# Patient Record
Sex: FEMALE | Race: WHITE | Employment: OTHER | URBAN - METROPOLITAN AREA
[De-identification: names, ages, dates, MRNs, and addresses within clinical notes are randomized per-mention and may not be internally consistent; named-entity substitution may affect disease eponyms.]

---

## 2017-02-20 ENCOUNTER — HOSPITAL ENCOUNTER (OUTPATIENT)
Dept: GENERAL RADIOLOGY | Age: 67
Discharge: HOME | End: 2017-02-20

## 2017-02-20 DIAGNOSIS — M25.551 HIP PAIN, RIGHT: ICD-10-CM

## 2017-08-31 ENCOUNTER — HOSPITAL ENCOUNTER (OUTPATIENT)
Dept: GENERAL RADIOLOGY | Age: 67
Discharge: HOME | End: 2017-08-31

## 2017-08-31 DIAGNOSIS — R51.9 FACIAL PAIN: ICD-10-CM

## 2017-12-20 PROBLEM — F33.2 MDD (MAJOR DEPRESSIVE DISORDER), RECURRENT SEVERE, WITHOUT PSYCHOSIS (HCC): Status: ACTIVE | Noted: 2017-12-20

## 2018-01-04 ENCOUNTER — HOSPITAL ENCOUNTER (OUTPATIENT)
Dept: GENERAL RADIOLOGY | Age: 68
Discharge: HOME | End: 2018-01-04

## 2018-01-04 DIAGNOSIS — M79.645 PAIN IN LEFT FINGER(S): ICD-10-CM

## 2018-01-20 PROBLEM — N18.30 CKD (CHRONIC KIDNEY DISEASE), STAGE III (HCC): Status: ACTIVE | Noted: 2018-01-20

## 2018-01-20 PROBLEM — R07.9 CHEST PAIN: Status: ACTIVE | Noted: 2018-01-20

## 2018-01-20 PROBLEM — Q60.0 CONGENITAL ABSENCE OF ONE KIDNEY: Status: ACTIVE | Noted: 2018-01-20

## 2018-04-20 PROBLEM — J40 BRONCHITIS: Status: ACTIVE | Noted: 2018-04-20

## 2018-04-20 PROBLEM — E66.9 OBESITY: Status: ACTIVE | Noted: 2018-04-20

## 2018-04-27 PROBLEM — F43.0: Status: ACTIVE | Noted: 2018-04-27

## 2018-04-27 PROBLEM — R41.2 AMNESIA (RETROGRADE): Status: ACTIVE | Noted: 2018-04-27

## 2018-06-01 ENCOUNTER — HOSPITAL ENCOUNTER (OUTPATIENT)
Dept: GENERAL RADIOLOGY | Age: 68
Discharge: HOME | End: 2018-06-01

## 2018-06-01 DIAGNOSIS — M79.602 ARM PAIN, LEFT: ICD-10-CM

## 2018-06-01 DIAGNOSIS — M79.601 ARM PAIN, RIGHT: ICD-10-CM

## 2018-12-07 ENCOUNTER — HOSPITAL ENCOUNTER (INPATIENT)
Age: 68
LOS: 9 days | Discharge: HOME | DRG: 885 | End: 2018-12-17
Attending: EMERGENCY MEDICINE | Admitting: PSYCHIATRY & NEUROLOGY
Payer: MEDICARE

## 2018-12-07 DIAGNOSIS — R45.851 DEPRESSION WITH SUICIDAL IDEATION: Primary | ICD-10-CM

## 2018-12-07 DIAGNOSIS — F32.A DEPRESSION WITH SUICIDAL IDEATION: Primary | ICD-10-CM

## 2018-12-07 LAB
A/G RATIO: 1.3 (ref 1.1–2.2)
ACETAMINOPHEN LEVEL: <5 UG/ML (ref 10–30)
ALBUMIN SERPL-MCNC: 3.7 G/DL (ref 3.4–5)
ALP BLD-CCNC: 178 U/L (ref 40–129)
ALT SERPL-CCNC: 8 U/L (ref 10–40)
AMPHETAMINE SCREEN, URINE: ABNORMAL
ANION GAP SERPL CALCULATED.3IONS-SCNC: 11 MMOL/L (ref 3–16)
AST SERPL-CCNC: 12 U/L (ref 15–37)
BARBITURATE SCREEN URINE: ABNORMAL
BASOPHILS ABSOLUTE: 0 K/UL (ref 0–0.2)
BASOPHILS RELATIVE PERCENT: 0.2 %
BENZODIAZEPINE SCREEN, URINE: ABNORMAL
BILIRUB SERPL-MCNC: 0.3 MG/DL (ref 0–1)
BILIRUBIN URINE: NEGATIVE
BLOOD, URINE: NEGATIVE
BUN BLDV-MCNC: 18 MG/DL (ref 7–20)
CALCIUM SERPL-MCNC: 9.5 MG/DL (ref 8.3–10.6)
CANNABINOID SCREEN URINE: ABNORMAL
CHLORIDE BLD-SCNC: 107 MMOL/L (ref 99–110)
CLARITY: CLEAR
CO2: 23 MMOL/L (ref 21–32)
COCAINE METABOLITE SCREEN URINE: ABNORMAL
COLOR: YELLOW
CREAT SERPL-MCNC: 1.1 MG/DL (ref 0.6–1.2)
EOSINOPHILS ABSOLUTE: 0.2 K/UL (ref 0–0.6)
EOSINOPHILS RELATIVE PERCENT: 3.2 %
ETHANOL: NORMAL MG/DL (ref 0–0.08)
GFR AFRICAN AMERICAN: 59
GFR NON-AFRICAN AMERICAN: 49
GLOBULIN: 2.9 G/DL
GLUCOSE BLD-MCNC: 120 MG/DL (ref 70–99)
GLUCOSE URINE: NEGATIVE MG/DL
HCT VFR BLD CALC: 35.6 % (ref 36–48)
HEMOGLOBIN: 11.9 G/DL (ref 12–16)
KETONES, URINE: NEGATIVE MG/DL
LEUKOCYTE ESTERASE, URINE: NEGATIVE
LYMPHOCYTES ABSOLUTE: 1.6 K/UL (ref 1–5.1)
LYMPHOCYTES RELATIVE PERCENT: 31.7 %
Lab: ABNORMAL
MCH RBC QN AUTO: 31 PG (ref 26–34)
MCHC RBC AUTO-ENTMCNC: 33.4 G/DL (ref 31–36)
MCV RBC AUTO: 93 FL (ref 80–100)
METHADONE SCREEN, URINE: ABNORMAL
MICROSCOPIC EXAMINATION: NORMAL
MONOCYTES ABSOLUTE: 0.6 K/UL (ref 0–1.3)
MONOCYTES RELATIVE PERCENT: 10.9 %
NEUTROPHILS ABSOLUTE: 2.7 K/UL (ref 1.7–7.7)
NEUTROPHILS RELATIVE PERCENT: 54 %
NITRITE, URINE: NEGATIVE
OPIATE SCREEN URINE: POSITIVE
OXYCODONE URINE: ABNORMAL
PDW BLD-RTO: 14.6 % (ref 12.4–15.4)
PH UA: 7.5
PH UA: 7.5
PHENCYCLIDINE SCREEN URINE: ABNORMAL
PLATELET # BLD: 183 K/UL (ref 135–450)
PMV BLD AUTO: 8.2 FL (ref 5–10.5)
POTASSIUM REFLEX MAGNESIUM: 3.7 MMOL/L (ref 3.5–5.1)
PROPOXYPHENE SCREEN: ABNORMAL
PROTEIN UA: NEGATIVE MG/DL
RBC # BLD: 3.83 M/UL (ref 4–5.2)
SALICYLATE, SERUM: <0.3 MG/DL (ref 15–30)
SODIUM BLD-SCNC: 141 MMOL/L (ref 136–145)
SPECIFIC GRAVITY UA: 1.01
TOTAL PROTEIN: 6.6 G/DL (ref 6.4–8.2)
URINE TYPE: NORMAL
UROBILINOGEN, URINE: 0.2 E.U./DL
WBC # BLD: 5 K/UL (ref 4–11)

## 2018-12-07 PROCEDURE — 81003 URINALYSIS AUTO W/O SCOPE: CPT

## 2018-12-07 PROCEDURE — G0480 DRUG TEST DEF 1-7 CLASSES: HCPCS

## 2018-12-07 PROCEDURE — 85025 COMPLETE CBC W/AUTO DIFF WBC: CPT

## 2018-12-07 PROCEDURE — 80053 COMPREHEN METABOLIC PANEL: CPT

## 2018-12-07 PROCEDURE — 93005 ELECTROCARDIOGRAM TRACING: CPT | Performed by: EMERGENCY MEDICINE

## 2018-12-07 PROCEDURE — 99285 EMERGENCY DEPT VISIT HI MDM: CPT

## 2018-12-07 PROCEDURE — 80307 DRUG TEST PRSMV CHEM ANLYZR: CPT

## 2018-12-07 ASSESSMENT — PAIN SCALES - GENERAL: PAINLEVEL_OUTOF10: 10

## 2018-12-08 PROBLEM — F33.9 MDD (MAJOR DEPRESSIVE DISORDER), RECURRENT EPISODE (HCC): Status: ACTIVE | Noted: 2018-12-08

## 2018-12-08 LAB
EKG ATRIAL RATE: 87 BPM
EKG DIAGNOSIS: NORMAL
EKG P AXIS: 43 DEGREES
EKG P-R INTERVAL: 190 MS
EKG Q-T INTERVAL: 364 MS
EKG QRS DURATION: 84 MS
EKG QTC CALCULATION (BAZETT): 438 MS
EKG R AXIS: 15 DEGREES
EKG T AXIS: 72 DEGREES
EKG VENTRICULAR RATE: 87 BPM

## 2018-12-08 PROCEDURE — 99222 1ST HOSP IP/OBS MODERATE 55: CPT | Performed by: PHYSICIAN ASSISTANT

## 2018-12-08 PROCEDURE — 93010 ELECTROCARDIOGRAM REPORT: CPT | Performed by: INTERNAL MEDICINE

## 2018-12-08 PROCEDURE — 1240000000 HC EMOTIONAL WELLNESS R&B

## 2018-12-08 PROCEDURE — 90792 PSYCH DIAG EVAL W/MED SRVCS: CPT | Performed by: NURSE PRACTITIONER

## 2018-12-08 RX ORDER — LORAZEPAM 0.5 MG/1
0.5 TABLET ORAL EVERY 12 HOURS PRN
Status: ON HOLD | COMMUNITY
End: 2018-12-08 | Stop reason: ALTCHOICE

## 2018-12-08 RX ORDER — ATORVASTATIN CALCIUM 80 MG/1
80 TABLET, FILM COATED ORAL NIGHTLY
COMMUNITY
Start: 2018-10-01

## 2018-12-08 RX ORDER — ASPIRIN 81 MG/1
81 TABLET, CHEWABLE ORAL DAILY
COMMUNITY

## 2018-12-08 RX ORDER — FAMOTIDINE 20 MG/1
20 TABLET, FILM COATED ORAL 2 TIMES DAILY
Status: DISCONTINUED | OUTPATIENT
Start: 2018-12-08 | End: 2018-12-08

## 2018-12-08 RX ORDER — PANTOPRAZOLE SODIUM 40 MG/1
40 TABLET, DELAYED RELEASE ORAL
Status: DISCONTINUED | OUTPATIENT
Start: 2018-12-08 | End: 2018-12-08

## 2018-12-08 RX ORDER — METHOCARBAMOL 500 MG/1
500 TABLET, FILM COATED ORAL 4 TIMES DAILY
Status: ON HOLD | COMMUNITY
End: 2018-12-17 | Stop reason: HOSPADM

## 2018-12-08 RX ORDER — ALBUTEROL SULFATE 90 UG/1
2 AEROSOL, METERED RESPIRATORY (INHALATION) EVERY 6 HOURS PRN
Status: DISCONTINUED | OUTPATIENT
Start: 2018-12-08 | End: 2018-12-08

## 2018-12-08 RX ORDER — TOPIRAMATE 100 MG/1
100 TABLET, FILM COATED ORAL 2 TIMES DAILY
Status: DISCONTINUED | OUTPATIENT
Start: 2018-12-08 | End: 2018-12-17 | Stop reason: HOSPADM

## 2018-12-08 RX ORDER — GABAPENTIN 600 MG/1
2 TABLET ORAL 2 TIMES DAILY
Status: ON HOLD | COMMUNITY
End: 2018-12-17 | Stop reason: HOSPADM

## 2018-12-08 RX ORDER — HYDROCODONE BITARTRATE AND ACETAMINOPHEN 5; 325 MG/1; MG/1
1 TABLET ORAL DAILY PRN
Status: ON HOLD | COMMUNITY
End: 2018-12-08 | Stop reason: ALTCHOICE

## 2018-12-08 RX ORDER — ATORVASTATIN CALCIUM 80 MG/1
80 TABLET, FILM COATED ORAL NIGHTLY
Status: ON HOLD | COMMUNITY
End: 2018-12-08 | Stop reason: SDUPTHER

## 2018-12-08 RX ORDER — RANOLAZINE 500 MG/1
500 TABLET, EXTENDED RELEASE ORAL 2 TIMES DAILY
Status: ON HOLD | COMMUNITY
End: 2018-12-08 | Stop reason: SDUPTHER

## 2018-12-08 RX ORDER — BUPROPION HYDROCHLORIDE 75 MG/1
75 TABLET ORAL DAILY
Status: ON HOLD | COMMUNITY
End: 2018-12-08 | Stop reason: SDUPTHER

## 2018-12-08 RX ORDER — CETIRIZINE HYDROCHLORIDE 10 MG/1
5 TABLET ORAL DAILY
Status: DISCONTINUED | OUTPATIENT
Start: 2018-12-08 | End: 2018-12-16

## 2018-12-08 RX ORDER — NITROGLYCERIN 0.4 MG/1
0.4 TABLET SUBLINGUAL EVERY 5 MIN PRN
Status: ON HOLD | COMMUNITY
End: 2018-12-08 | Stop reason: ALTCHOICE

## 2018-12-08 RX ORDER — DONEPEZIL HYDROCHLORIDE 5 MG/1
10 TABLET, FILM COATED ORAL NIGHTLY
Status: DISCONTINUED | OUTPATIENT
Start: 2018-12-08 | End: 2018-12-17 | Stop reason: HOSPADM

## 2018-12-08 RX ORDER — RANITIDINE 150 MG/1
150 TABLET ORAL 2 TIMES DAILY
Status: ON HOLD | COMMUNITY
End: 2018-12-17 | Stop reason: HOSPADM

## 2018-12-08 RX ORDER — TOPIRAMATE 100 MG/1
100 TABLET, FILM COATED ORAL 2 TIMES DAILY
Status: ON HOLD | COMMUNITY
End: 2019-04-01 | Stop reason: HOSPADM

## 2018-12-08 RX ORDER — FAMOTIDINE 20 MG/1
20 TABLET, FILM COATED ORAL 2 TIMES DAILY
Status: DISCONTINUED | OUTPATIENT
Start: 2018-12-08 | End: 2018-12-17 | Stop reason: HOSPADM

## 2018-12-08 RX ORDER — MONTELUKAST SODIUM 10 MG/1
10 TABLET ORAL NIGHTLY
Status: DISCONTINUED | OUTPATIENT
Start: 2018-12-08 | End: 2018-12-17 | Stop reason: HOSPADM

## 2018-12-08 RX ORDER — RANOLAZINE 500 MG/1
500 TABLET, EXTENDED RELEASE ORAL 2 TIMES DAILY
Status: DISCONTINUED | OUTPATIENT
Start: 2018-12-08 | End: 2018-12-08

## 2018-12-08 RX ORDER — ATORVASTATIN CALCIUM 40 MG/1
80 TABLET, FILM COATED ORAL NIGHTLY
Status: DISCONTINUED | OUTPATIENT
Start: 2018-12-08 | End: 2018-12-17 | Stop reason: HOSPADM

## 2018-12-08 RX ORDER — CLOPIDOGREL BISULFATE 75 MG/1
75 TABLET ORAL DAILY
COMMUNITY
Start: 2018-07-30

## 2018-12-08 RX ORDER — LISINOPRIL 20 MG/1
40 TABLET ORAL DAILY
Status: DISCONTINUED | OUTPATIENT
Start: 2018-12-08 | End: 2018-12-17 | Stop reason: HOSPADM

## 2018-12-08 RX ORDER — GABAPENTIN 600 MG/1
1200 TABLET ORAL 2 TIMES DAILY
Status: ON HOLD | COMMUNITY
End: 2018-12-08 | Stop reason: SDUPTHER

## 2018-12-08 RX ORDER — LISINOPRIL 40 MG/1
40 TABLET ORAL DAILY
Status: ON HOLD | COMMUNITY
End: 2019-04-01 | Stop reason: HOSPADM

## 2018-12-08 RX ORDER — LEVOCETIRIZINE DIHYDROCHLORIDE 5 MG/1
5 TABLET, FILM COATED ORAL NIGHTLY
Status: ON HOLD | COMMUNITY
End: 2018-12-17 | Stop reason: HOSPADM

## 2018-12-08 RX ORDER — RANITIDINE 150 MG/1
150 TABLET ORAL 2 TIMES DAILY
Status: ON HOLD | COMMUNITY
End: 2018-12-08 | Stop reason: SDUPTHER

## 2018-12-08 RX ORDER — RANOLAZINE 500 MG/1
500 TABLET, EXTENDED RELEASE ORAL 2 TIMES DAILY
Status: DISCONTINUED | OUTPATIENT
Start: 2018-12-08 | End: 2018-12-17 | Stop reason: HOSPADM

## 2018-12-08 RX ORDER — LEVOTHYROXINE SODIUM 0.1 MG/1
100 TABLET ORAL DAILY
Status: DISCONTINUED | OUTPATIENT
Start: 2018-12-08 | End: 2018-12-08

## 2018-12-08 RX ORDER — CHOLECALCIFEROL (VITAMIN D3) 125 MCG
1000 CAPSULE ORAL DAILY
Status: DISCONTINUED | OUTPATIENT
Start: 2018-12-08 | End: 2018-12-08

## 2018-12-08 RX ORDER — CYANOCOBALAMIN (VITAMIN B-12) 1000 MCG
1000 TABLET ORAL DAILY
COMMUNITY
Start: 2016-06-01

## 2018-12-08 RX ORDER — PANTOPRAZOLE SODIUM 40 MG/1
40 TABLET, DELAYED RELEASE ORAL 2 TIMES DAILY
Status: DISCONTINUED | OUTPATIENT
Start: 2018-12-08 | End: 2018-12-17 | Stop reason: HOSPADM

## 2018-12-08 RX ORDER — TRAZODONE HYDROCHLORIDE 50 MG/1
100 TABLET ORAL NIGHTLY
Status: ON HOLD | COMMUNITY
Start: 2018-03-06 | End: 2018-12-17 | Stop reason: HOSPADM

## 2018-12-08 RX ORDER — CHOLECALCIFEROL (VITAMIN D3) 125 MCG
1000 CAPSULE ORAL DAILY
Status: DISCONTINUED | OUTPATIENT
Start: 2018-12-08 | End: 2018-12-17 | Stop reason: HOSPADM

## 2018-12-08 RX ORDER — ATORVASTATIN CALCIUM 40 MG/1
80 TABLET, FILM COATED ORAL NIGHTLY
Status: DISCONTINUED | OUTPATIENT
Start: 2018-12-08 | End: 2018-12-08

## 2018-12-08 RX ORDER — MONTELUKAST SODIUM 10 MG/1
10 TABLET ORAL NIGHTLY
COMMUNITY
End: 2020-04-16

## 2018-12-08 RX ORDER — DONEPEZIL HYDROCHLORIDE 10 MG/1
10 TABLET, FILM COATED ORAL NIGHTLY
COMMUNITY

## 2018-12-08 RX ORDER — CLOPIDOGREL BISULFATE 75 MG/1
75 TABLET ORAL DAILY
Status: DISCONTINUED | OUTPATIENT
Start: 2018-12-08 | End: 2018-12-17 | Stop reason: HOSPADM

## 2018-12-08 RX ORDER — CETIRIZINE HYDROCHLORIDE 10 MG/1
5 TABLET ORAL DAILY
Status: DISCONTINUED | OUTPATIENT
Start: 2018-12-08 | End: 2018-12-08

## 2018-12-08 RX ORDER — PANTOPRAZOLE SODIUM 40 MG/1
40 TABLET, DELAYED RELEASE ORAL 2 TIMES DAILY
COMMUNITY

## 2018-12-08 RX ORDER — ACETAMINOPHEN 325 MG/1
650 TABLET ORAL EVERY 4 HOURS PRN
Status: DISCONTINUED | OUTPATIENT
Start: 2018-12-08 | End: 2018-12-17 | Stop reason: HOSPADM

## 2018-12-08 RX ORDER — LISINOPRIL 20 MG/1
40 TABLET ORAL DAILY
Status: DISCONTINUED | OUTPATIENT
Start: 2018-12-08 | End: 2018-12-08

## 2018-12-08 RX ORDER — TOPIRAMATE 25 MG/1
100 TABLET ORAL 2 TIMES DAILY
Status: ON HOLD | COMMUNITY
End: 2018-12-08 | Stop reason: SDUPTHER

## 2018-12-08 RX ORDER — BUPROPION HYDROCHLORIDE 75 MG/1
75 TABLET ORAL DAILY
Status: DISCONTINUED | OUTPATIENT
Start: 2018-12-08 | End: 2018-12-08

## 2018-12-08 RX ORDER — MONTELUKAST SODIUM 10 MG/1
10 TABLET ORAL NIGHTLY
Status: DISCONTINUED | OUTPATIENT
Start: 2018-12-08 | End: 2018-12-08

## 2018-12-08 RX ORDER — BUPROPION HYDROCHLORIDE 75 MG/1
75 TABLET ORAL DAILY
Status: ON HOLD | COMMUNITY
Start: 2018-10-16 | End: 2018-12-17 | Stop reason: HOSPADM

## 2018-12-08 RX ORDER — ALBUTEROL SULFATE 90 UG/1
2 AEROSOL, METERED RESPIRATORY (INHALATION) EVERY 6 HOURS PRN
Status: ON HOLD | COMMUNITY
End: 2018-12-08 | Stop reason: ALTCHOICE

## 2018-12-08 RX ORDER — HYDROXYZINE HYDROCHLORIDE 25 MG/1
25 TABLET, FILM COATED ORAL 3 TIMES DAILY PRN
Status: ON HOLD | COMMUNITY
End: 2019-04-01 | Stop reason: HOSPADM

## 2018-12-08 RX ORDER — DULOXETIN HYDROCHLORIDE 60 MG/1
60 CAPSULE, DELAYED RELEASE ORAL NIGHTLY
Status: ON HOLD | COMMUNITY
End: 2018-12-17 | Stop reason: HOSPADM

## 2018-12-08 RX ORDER — FUROSEMIDE 20 MG/1
20 TABLET ORAL NIGHTLY
Status: DISCONTINUED | OUTPATIENT
Start: 2018-12-08 | End: 2018-12-12

## 2018-12-08 RX ORDER — RANOLAZINE 500 MG/1
500 TABLET, EXTENDED RELEASE ORAL 2 TIMES DAILY
COMMUNITY
End: 2019-03-30

## 2018-12-08 RX ORDER — CLOPIDOGREL BISULFATE 75 MG/1
75 TABLET ORAL DAILY
Status: DISCONTINUED | OUTPATIENT
Start: 2018-12-08 | End: 2018-12-08

## 2018-12-08 RX ORDER — DONEPEZIL HYDROCHLORIDE 5 MG/1
10 TABLET, FILM COATED ORAL NIGHTLY
Status: DISCONTINUED | OUTPATIENT
Start: 2018-12-08 | End: 2018-12-08

## 2018-12-08 RX ORDER — AMLODIPINE BESYLATE 5 MG/1
5 TABLET ORAL DAILY
Status: DISCONTINUED | OUTPATIENT
Start: 2018-12-08 | End: 2018-12-08

## 2018-12-08 RX ORDER — TRAMADOL HYDROCHLORIDE 50 MG/1
50 TABLET ORAL EVERY 6 HOURS PRN
Status: ON HOLD | COMMUNITY
End: 2018-12-08 | Stop reason: ALTCHOICE

## 2018-12-08 RX ORDER — ASPIRIN 81 MG/1
81 TABLET, CHEWABLE ORAL DAILY
Status: DISCONTINUED | OUTPATIENT
Start: 2018-12-08 | End: 2018-12-17 | Stop reason: HOSPADM

## 2018-12-08 RX ORDER — FUROSEMIDE 20 MG/1
20 TABLET ORAL NIGHTLY
Status: ON HOLD | COMMUNITY
Start: 2018-07-30 | End: 2019-04-01 | Stop reason: HOSPADM

## 2018-12-08 ASSESSMENT — SLEEP AND FATIGUE QUESTIONNAIRES
DIFFICULTY ARISING: YES
DO YOU HAVE DIFFICULTY SLEEPING: NO
RESTFUL SLEEP: NO
DIFFICULTY FALLING ASLEEP: NO
DO YOU USE A SLEEP AID: YES
DIFFICULTY STAYING ASLEEP: YES
SLEEP PATTERN: RESTLESSNESS
AVERAGE NUMBER OF SLEEP HOURS: 16

## 2018-12-08 ASSESSMENT — LIFESTYLE VARIABLES: HISTORY_ALCOHOL_USE: NO

## 2018-12-08 ASSESSMENT — PATIENT HEALTH QUESTIONNAIRE - PHQ9: SUM OF ALL RESPONSES TO PHQ QUESTIONS 1-9: 15

## 2018-12-08 NOTE — H&P
MG tablet Take 20 mg by mouth daily   Yes Historical Provider, MD   levocetirizine (XYZAL) 5 MG tablet Take 5 mg by mouth nightly   Yes Historical Provider, MD   lisinopril (PRINIVIL;ZESTRIL) 40 MG tablet Take 40 mg by mouth daily   Yes Historical Provider, MD   montelukast (SINGULAIR) 10 MG tablet Take 10 mg by mouth nightly   Yes Historical Provider, MD   vitamin B-12 (CYANOCOBALAMIN) 1000 MCG tablet Take 1,000 mcg by mouth daily   Yes Historical Provider, MD   traMADol (ULTRAM) 50 MG tablet Take 50 mg by mouth every 6 hours as needed for Pain. Fabiola Shelley Historical Provider, MD   nitroGLYCERIN (NITROSTAT) 0.4 MG SL tablet Place 0.4 mg under the tongue every 5 minutes as needed for Chest pain up to max of 2 total doses. If no relief after 2 doses, call 911. Historical Provider, MD   darifenacin (ENABLEX) 15 MG extended release tablet Take by mouth    Historical Provider, MD   diclofenac (VOLTAREN) 50 MG EC tablet Take 50 mg by mouth 1/4/18   Historical Provider, MD   methocarbamol (ROBAXIN) 500 MG tablet Take 2 tabs q night 1/18/18   Historical Provider, MD   mirtazapine (REMERON) 15 MG tablet Take 1 tablet by mouth nightly 12/27/17   KELI Cunningham CNP   lovastatin (ALTOPREV) 40 MG extended release tablet Take 40 mg by mouth nightly    Historical Provider, MD   metoprolol tartrate (LOPRESSOR) 25 MG tablet Take 12.5 mg by mouth 2 times daily    Historical Provider, MD       Allergies:  Lithium; Penicillins; Tetanus antitoxin; Thimerosal; Demerol hcl [meperidine]; Iodine; Morpholine salicylate; and Tetanus toxoids    Social History:  The patient currently lives at home by herself     TOBACCO:   reports that she has never smoked. She has never used smokeless tobacco.  ETOH:   reports that she does not drink alcohol.       Family History:   Positive as follows:    Family History   Problem Relation Age of Onset    High Blood Pressure Mother     High Blood Pressure Father     Cancer Sister        REVIEW OF SYSTEMS:       Constitutional: Negative for fever   HENT: Negative for sore throat   Eyes: Negative for redness   Respiratory: Negative  for dyspnea, cough   Cardiovascular: Negative for chest pain   Gastrointestinal: Negative for vomiting, diarrhea   Genitourinary: Negative for hematuria   Musculoskeletal: Negative for arthralgias   Skin: Negative for rash   Neurological: Negative for syncope    Hematological: Negative for easy bruising/bleeding   Psychiatric/Behavorial: Per psychiatry team evaluation     PHYSICAL EXAM:    BP (!) 150/69   Pulse 66   Temp 98.2 °F (36.8 °C) (Oral)   Resp 16   Ht 5' 2\" (1.575 m)   Wt 200 lb (90.7 kg)   SpO2 95%   BMI 36.58 kg/m²     Gen: Elderly female seen laying in bed. No distress. Alert. Eyes: PERRL. No sclera icterus. No conjunctival injection. ENT: No discharge. Pharynx clear. Neck: No JVD. No Carotid Bruit. Trachea midline. Resp: No accessory muscle use. No crackles. No wheezes. No rhonchi. CV: Regular rate. Regular rhythm. No murmur. No rub. No edema. GI: Non-tender. Non-distended. Normal bowel sounds. Skin: Warm and dry. No nodule on exposed extremities. No rash on exposed extremities. M/S: No cyanosis. No joint deformity. No clubbing. Neuro: Awake. No focal neurologic deficit on exam.  Cranial nerves II through XII intact. Patient is able to ambulate without difficulty. Psych: Per psychiatry team evaluation     CBC:   Recent Labs      12/07/18 2112   WBC  5.0   HGB  11.9*   HCT  35.6*   MCV  93.0   PLT  183     BMP:   Recent Labs      12/07/18 2112   NA  141   K  3.7   CL  107   CO2  23   BUN  18   CREATININE  1.1     LIVER PROFILE:   Recent Labs      12/07/18 2112   AST  12*   ALT  8*   BILITOT  0.3   ALKPHOS  178*     PT/INR: No results for input(s): PROTIME, INR in the last 72 hours. APTT: No results for input(s): APTT in the last 72 hours.   UA:  Recent Labs      12/07/18 2053   COLORU  Yellow   PHUR  7.5  7.5   CLARITYU  Clear

## 2018-12-08 NOTE — H&P
(NEURONTIN) 600 MG tablet, Take 1,200 mg by mouth 2 times daily. .  ranolazine (RANEXA) 500 MG extended release tablet, Take 500 mg by mouth 2 times daily  ranitidine (ZANTAC) 150 MG tablet, Take 150 mg by mouth 2 times daily  atorvastatin (LIPITOR) 80 MG tablet, Take 80 mg by mouth nightly  LORazepam (ATIVAN) 0.5 MG tablet, Take 0.5 mg by mouth every 12 hours as needed for Anxiety. Lucinda Norse albuterol sulfate  (90 Base) MCG/ACT inhaler, Inhale 2 puffs into the lungs every 6 hours as needed for Wheezing or Shortness of Breath  gabapentin (NEURONTIN) 600 MG tablet, Take 2 tablets by mouth 2 times daily. Lucinda Norse   aspirin 81 MG chewable tablet, Take 81 mg by mouth daily  lisinopril (PRINIVIL;ZESTRIL) 40 MG tablet, Take 40 mg by mouth daily  montelukast (SINGULAIR) 10 MG tablet, Take 10 mg by mouth nightly  topiramate (TOPAMAX) 100 MG tablet, Take 100 mg by mouth 2 times daily  ranolazine (RANEXA) 500 MG extended release tablet, Take 500 mg by mouth 2 times daily  topiramate (TOPAMAX) 25 MG tablet, Take 100 mg by mouth 2 times daily  traZODone (DESYREL) 50 MG tablet, Take 100 mg by mouth nightly  Cyanocobalamin (B-12) 1000 MCG TABS, Take 1,000 mcg by mouth daily  hydrOXYzine (ATARAX) 25 MG tablet, Take 25 mg by mouth 3 times daily as needed  methocarbamol (ROBAXIN) 500 MG tablet, Take 500 mg by mouth 4 times daily  atorvastatin (LIPITOR) 80 MG tablet, Take 80 mg by mouth nightly  buPROPion (WELLBUTRIN) 75 MG tablet, Take 75 mg by mouth daily  clopidogrel (PLAVIX) 75 MG tablet, Take 75 mg by mouth daily  furosemide (LASIX) 20 MG tablet, Take 20 mg by mouth daily  traZODone (DESYREL) 50 MG tablet, Take 1 tablet by mouth nightly as needed for Sleep (Patient taking differently: Take 100 mg by mouth nightly )  omeprazole (PRILOSEC) 20 MG delayed release capsule, Take 1 capsule by mouth 2 times daily (Patient taking differently: Take 40 mg by mouth 2 times daily )  rOPINIRole (REQUIP) 0.25 MG tablet, Take 0.25 mg by mouth  topiramate (TOPAMAX) 25 MG tablet, Take 1 tablet by mouth 2 times daily (Patient taking differently: Take 100 mg by mouth 2 times daily )  DULoxetine (CYMBALTA) 30 MG extended release capsule, Take 3 capsules by mouth daily (Patient taking differently: Take 60 mg by mouth nightly )  levothyroxine (SYNTHROID) 100 MCG tablet, Take 1 tablet by mouth daily  amLODIPine (NORVASC) 5 MG tablet, Take 5 mg by mouth daily  clopidogrel (PLAVIX) 75 MG tablet, Take 75 mg by mouth daily  donepezil (ARICEPT) 10 MG tablet, Take 10 mg by mouth nightly  furosemide (LASIX) 20 MG tablet, Take 20 mg by mouth daily  levocetirizine (XYZAL) 5 MG tablet, Take 5 mg by mouth nightly  lisinopril (PRINIVIL;ZESTRIL) 40 MG tablet, Take 40 mg by mouth daily  montelukast (SINGULAIR) 10 MG tablet, Take 10 mg by mouth nightly  vitamin B-12 (CYANOCOBALAMIN) 1000 MCG tablet, Take 1,000 mcg by mouth daily  HYDROcodone-acetaminophen (NORCO) 5-325 MG per tablet, Take 1 tablet by mouth daily as needed for Pain. .  traMADol (ULTRAM) 50 MG tablet, Take 50 mg by mouth every 6 hours as needed for Pain. .  nitroGLYCERIN (NITROSTAT) 0.4 MG SL tablet, Place 0.4 mg under the tongue every 5 minutes as needed for Chest pain up to max of 2 total doses. If no relief after 2 doses, call 911.   donepezil (ARICEPT) 10 MG tablet, Take 10 mg by mouth nightly  levocetirizine (XYZAL) 5 MG tablet, Take 5 mg by mouth nightly  ranitidine (ZANTAC) 150 MG tablet, Take 150 mg by mouth 2 times daily  darifenacin (ENABLEX) 15 MG extended release tablet, Take by mouth  [DISCONTINUED] diclofenac (VOLTAREN) 50 MG EC tablet, Take 50 mg by mouth  [DISCONTINUED] methocarbamol (ROBAXIN) 500 MG tablet, Take 2 tabs q night  [DISCONTINUED] mirtazapine (REMERON) 15 MG tablet, Take 1 tablet by mouth nightly  [DISCONTINUED] aspirin 81 MG tablet, Take 81 mg by mouth daily  [DISCONTINUED] metoprolol tartrate (LOPRESSOR) 25 MG tablet, Take 12.5 mg by mouth 2 times daily  Allergies   Allergen Reactions    0.2 K/uL   Comprehensive Metabolic Panel w/ Reflex to MG    Collection Time: 12/07/18  9:12 PM   Result Value Ref Range    Sodium 141 136 - 145 mmol/L    Potassium reflex Magnesium 3.7 3.5 - 5.1 mmol/L    Chloride 107 99 - 110 mmol/L    CO2 23 21 - 32 mmol/L    Anion Gap 11 3 - 16    Glucose 120 (H) 70 - 99 mg/dL    BUN 18 7 - 20 mg/dL    CREATININE 1.1 0.6 - 1.2 mg/dL    GFR Non- 49 (A) >60    GFR  59 (A) >60    Calcium 9.5 8.3 - 10.6 mg/dL    Total Protein 6.6 6.4 - 8.2 g/dL    Alb 3.7 3.4 - 5.0 g/dL    Albumin/Globulin Ratio 1.3 1.1 - 2.2    Total Bilirubin 0.3 0.0 - 1.0 mg/dL    Alkaline Phosphatase 178 (H) 40 - 129 U/L    ALT 8 (L) 10 - 40 U/L    AST 12 (L) 15 - 37 U/L    Globulin 2.9 g/dL   Acetaminophen level    Collection Time: 12/07/18  9:12 PM   Result Value Ref Range    Acetaminophen Level <5 (L) 10 - 30 ug/mL   Salicylate    Collection Time: 12/07/18  9:12 PM   Result Value Ref Range    Salicylate, Serum <3.8 (L) 15.0 - 30.0 mg/dL   Ethanol    Collection Time: 12/07/18  9:12 PM   Result Value Ref Range    Ethanol Lvl None Detected mg/dL       Physical Assessment: Per Internal Medicine     PSYCHIATRIC ASSESSMENT   PSYCHIATRIC EXAMINATION / MENTAL STATUS EXAM:     General appearance: [] WNL [x] disheveled [] poor hygiene []      Relatedness: [] cooperative [] guarded [x] indifferent [] hostile [] sedated    Speech:  [] WNL [x] pressured [] decreased volume [x] increased volume [] delayed     Kinetics: [] normal [x] increased [] decreased    Mood: [] depressed [] anxious [x] irritable [x] labile [] euphoric [] decreased range    Affect: [] normal range [] blunted [] flat [x] labile [] mood incongruent     Thought Process: [x] logical [] tangential [] delayed/TB [] disorganized []FOI [] concrete     Thought Content: [] future oriented [] delusions [] guilt [] paranoid                               [x] hopelessness  [] obsessive [x] suicidal     Delusions: [x] none [] grandiose

## 2018-12-08 NOTE — PRE-CERTIFICATION NOTE
Pt insurance is listed as medicare. No initial pre-cert required. UR notified as well.     PRINCESS Velazquez

## 2018-12-08 NOTE — ED PROVIDER NOTES
[address] ED  eMERGENCY dEPARTMENT eNCOUnter        Pt Name: Constantine Gimenez  MRN: [id number]  [place] 1950  Date of evaluation: 12/7/2018  Provider: Naga Martinez DO  PCP: Chevy Santiago MD      CHIEF COMPLAINT       Chief Complaint   Patient presents with   [facility] Psychiatric Evaluation     pt brought in by police due to pt calling 911 and making SI remarks. Pt was at the grocery and didnt have enough money and pt called daughters but they didnt seem to care. Pt told police she was going to take all of her pills        HISTORY OF PRESENT ILLNESS   (Location/Symptom, Timing/Onset, Context/Setting, Quality, Duration, Modifying Factors, Severity)  Note limiting factors. Constantine Gimenez is a 77 y.o. female  presents to the emergency department with complaint of depression and worsening SI over the course of this week with plan to take pills. Denies attempt. Denies HI/vis/aud hallucinations. Triggered by financial and family stress. Called 911 herself and transported to ED by PD. Denies medical complaints    Nursing Notes were all reviewed and agreed with or any disagreements were addressed  in the HPI. REVIEW OF SYSTEMS    (2-9 systems for level 4, 10 or more for level 5)     Review of Systems  REVIEW OF SYSTEMS    Constitutional:  Denies fever, chills, weight loss or weakness   Eyes:  Denies photophobia or discharge   HENT:  Denies sore throat or ear pain   Respiratory:  Denies cough or shortness of breath   Cardiovascular:  Denies chest pain, palpitations or swelling   GI:  Denies abdominal pain, nausea, vomiting, or diarrhea   Musculoskeletal:  Denies back pain   Skin:  Denies rash   Neurologic:  Denies headache, focal weakness or sensory changes   Endocrine:  Denies polyuria or polydypsia   Lymphatic:  Denies swollen glands   Psychiatric:  Denies  homicidal ideation   All systems negative except as marked. Positives and Pertinent negatives as per HPI.   Except as

## 2018-12-08 NOTE — ED NOTES
2100 ~ writer assisted patient to the restroom in [facility] AN [facility]FF[facility] OF [facility], obtained urine sample, sent to lab. Pt assisted out of clothes and into blue 3 armed gown. EKG completed. Dr. Ambrosio Henry speaking to pt at this time. All personal articles are out at the [facility]. Pt does have personal walker with her.      Portia Rodrigues  12/07/18 2118
Pt arrived ambulatory to [facility] with Clark Aschoff, ED Tech. Pt ambulated to restroom without her walker. Tracy Holliday is in room, but pt states that she can walk short distances without it. No further needs. Will continue to monitor for safety.     PRINCESS Milton
Pt. Currently w/behavioral health at bedside for assessment.      Vitaliy Saha RN  12/07/18 9709
PROTECTIVE FACTORS:  [] Age >25 and <55  [x] Female gender   [] Denies depression  [] Denies suicidal ideation  [] Does not have lethal plan   [x] Does not have access to guns or weapons  [] Patient is verbally pita for safety  [] No prior suicide attempts  [x] No active substance abuse  [] Patient has social or family support  [] No active psychosis or cognitive dysfunction  [] Physically healthy  [] Has outpatient services in place  [x] Compliant with recommended medications  [] Collateral information from supports patient safety pt refuses to provide family information  [] Patient is future oriented with plans to   []       Clinical Summary:    Patient presents to [facility] on a police SOB after she called 911 for reported SI by OD  Patient was clinically sober at the time of the evaluation. Patient was evaluated and offered supportive counseling. Cassie Delgado reports chronic depression throughout her adult lifetime but worsens this time every year, especially since her   6 years ago. Pt states she lives on a fixed income in independent living at [facility] for the past 6 months. She repeatedly states, \"I wish I hadn't called, I don't have anything to live for\". Pt states she was \"humiliated\" today at the grocery store, when she had to put back several things she wanted b/c she did not have enough money in her account to pay for them. \"I can't be trusted to handle my money, so my daughter puts in enough money for me to get my groceries. But one of my creditors took out a payment and I had to call my daughter at work to put some more in so I could get the things I needed. I have no family that care about me, they don't need anything so I'm of no use to them, I have no friends who care about me. I have absolutely nothing to live for. Pt unable to contract for safety and continued to voice SI throughout interview.              Wayman Favre, RN  [address] [place] [address],

## 2018-12-09 PROCEDURE — 1240000000 HC EMOTIONAL WELLNESS R&B

## 2018-12-09 RX ORDER — AMMONIA INHALANTS 0.04 G/.3ML
INHALANT RESPIRATORY (INHALATION)
Status: DISPENSED
Start: 2018-12-09 | End: 2018-12-10

## 2018-12-09 ASSESSMENT — SLEEP AND FATIGUE QUESTIONNAIRES
DO YOU HAVE DIFFICULTY SLEEPING: YES
AVERAGE NUMBER OF SLEEP HOURS: 6
SLEEP PATTERN: RESTLESSNESS;INSOMNIA
DIFFICULTY FALLING ASLEEP: YES
DIFFICULTY STAYING ASLEEP: YES
RESTFUL SLEEP: NO
DO YOU USE A SLEEP AID: NO
DIFFICULTY ARISING: NO

## 2018-12-09 ASSESSMENT — PATIENT HEALTH QUESTIONNAIRE - PHQ9: SUM OF ALL RESPONSES TO PHQ QUESTIONS 1-9: 18

## 2018-12-09 ASSESSMENT — LIFESTYLE VARIABLES: HISTORY_ALCOHOL_USE: NO

## 2018-12-09 NOTE — PROGRESS NOTES
`[facility] [place]  Admission Note     Admission Type:   Admission Type:  Involuntary    Reason for admission:  Reason for Admission: depressed and SI    PATIENT STRENGTHS:  Strengths: Communication, Medication Compliance    Patient Strengths and Limitations:  Limitations: Lacks leisure interests, Multiple barriers to leisure interests, Tendency to isolate self, General negative or hopeless attitude about future/recovery, Difficult relationships / poor social skills    Addictive Behavior:   Addictive Behavior  In the past 3 months, have you felt or has someone told you that you have a problem with:  : None  Do you have a history of Chemical Use?: No  Do you have a history of Alcohol Use?: No  Do you have a history of Street Drug Abuse?: No  Histroy of Prescripton Drug Abuse?: No    Medical Problems:   Past Medical History:   Diagnosis Date    Anxiety     Asthma     Bipolar 1 disorder ([facility] [address]     CAD (coronary artery disease)     Depression     GERD (gastroesophageal reflux disease)     Suicidal behavior     hx: 5 years ago and 4 other times 10 years ago    Thyroid disease        Status EXAM:  Status and Exam  Normal: No  Facial Expression: Sad, Hostile  Affect: Unstable  Level of Consciousness: Alert  Mood:Normal: No  Mood: Depressed, Labile, Suspicious, Angry  Motor Activity:Normal: No  Motor Activity: Decreased  Interview Behavior: Evasive, Impulsive, Irritable, Uncooperative/Withdrawn  Preception: [place] to Person, Lynnann Kocher to Time, [place] to Place, [place] to Situation  Attention:Normal: No  Attention: Distractible, Hyperalert  Thought Processes: Tangential  Thought Content:Normal: No  Thought Content: Paranoia, Preoccupations  Hallucinations: None  Delusions: No  Memory:Normal: Yes  Insight and Judgment: No  Insight and Judgment: Poor Judgment, Poor Insight, Unmotivated  Present Suicidal Ideation: No  Present Homicidal Ideation: No    Tobacco Screening:  Practical Counseling, on admission, orly AARON

## 2018-12-10 PROBLEM — F33.3 SEVERE EPISODE OF RECURRENT MAJOR DEPRESSIVE DISORDER, WITH PSYCHOTIC FEATURES (HCC): Status: ACTIVE | Noted: 2018-12-10

## 2018-12-10 PROBLEM — F60.3 BORDERLINE PERSONALITY DISORDER (HCC): Status: ACTIVE | Noted: 2018-12-10

## 2018-12-10 PROCEDURE — 6370000000 HC RX 637 (ALT 250 FOR IP): Performed by: NURSE PRACTITIONER

## 2018-12-10 PROCEDURE — 6370000000 HC RX 637 (ALT 250 FOR IP): Performed by: PHYSICIAN ASSISTANT

## 2018-12-10 PROCEDURE — 99233 SBSQ HOSP IP/OBS HIGH 50: CPT | Performed by: PSYCHIATRY & NEUROLOGY

## 2018-12-10 PROCEDURE — 1240000000 HC EMOTIONAL WELLNESS R&B

## 2018-12-10 RX ORDER — DIMETHICONE, OXYBENZONE, AND PADIMATE O 2; 2.5; 6.6 G/100G; G/100G; G/100G
STICK TOPICAL PRN
Status: DISCONTINUED | OUTPATIENT
Start: 2018-12-10 | End: 2018-12-17 | Stop reason: HOSPADM

## 2018-12-10 RX ADMIN — ACETAMINOPHEN 650 MG: 325 TABLET ORAL at 23:03

## 2018-12-10 ASSESSMENT — PAIN SCALES - GENERAL: PAINLEVEL_OUTOF10: 10

## 2018-12-10 NOTE — PROGRESS NOTES
1:1 Assessment 10 minutes. Patient is alert and oriented x 4. Uses direct eye contact and is dressed appropriately in street clothing. Patient endorsed SI,with a plan to not take her medications or to eat or to drink fluids. She denied HI,A/V hallucinations. The patient stated her mood was:\"Pretty rotten\". Patient did attend groups this shift but didn't participate and left before the group ended. The patient does understand why she is here, she said that she spoke to her daughter to tell her how bad she was feeling and her daughter basically told her to go to the hospital if she feels that bad. Patient refuses medication. Judgement,insight and impulse control are limited. Patient offered complaint of diarhea this evening. Vital signs are noted. Safety checks continue Q 15 minutes throughout the shift. PRNS this shift? NO. Patient obtained 6.5 hours of sleep this shift.

## 2018-12-10 NOTE — PLAN OF CARE
Problem: Cardiac:  Goal: Ability to maintain vital signs within normal range will improve  Ability to maintain vital signs within normal range will improve   Outcome: Ongoing  Patient blood pressure elevated at the beginning of the shift. Recheck b/p 180/80. Patient continues to refuse medications for medical issues.

## 2018-12-10 NOTE — PROGRESS NOTES
12/07/2018 Neg  Negative <200 ng/mL Final    Cannabinoid Scrn, Ur 12/07/2018 Neg  Negative <50 ng/mL Final    Cocaine Metabolite Screen, Urine 12/07/2018 Neg  Negative <300 ng/mL Final    Opiate Scrn, Ur 12/07/2018 POSITIVE* Negative <300 ng/mL Final    Comment: \"Therapeutic levels of pain medication, especially oxycontin and synthetic  opioids, may not be detected by this Methodology. Pain management screen  panel  Drug panel-PM-Hi Res Ur, Interp (PAIN) should be considered for drug  monitoring \".  PCP Screen, Urine 12/07/2018 Neg  Negative <25 ng/mL Final    Methadone Screen, Urine 12/07/2018 Neg  Negative <300 ng/mL Final    Propoxyphene Scrn, Ur 12/07/2018 Neg  Negative <300 ng/mL Final    pH, UA 12/07/2018 7.5   Final    Comment: Urine pH less than 5.0 or greater than 8.0 may indicate sample adulteration. Another sample should be collected if clinically  indicated.  Drug Screen Comment: 12/07/2018 see below   Final    Comment: This method is a screening test to detect only these drug  classes as part of a medical workup. Confirmatory testing  by another method should be ordered if clinically indicated.       Oxycodone Urine 12/07/2018 Neg  Negative <100 ng/ml Final            Medications  Current Facility-Administered Medications: acetaminophen (TYLENOL) tablet 650 mg, 650 mg, Oral, Q4H PRN  magnesium hydroxide (MILK OF MAGNESIA) 400 MG/5ML suspension 30 mL, 30 mL, Oral, Daily PRN  aspirin chewable tablet 81 mg, 81 mg, Oral, Daily  atorvastatin (LIPITOR) tablet 80 mg, 80 mg, Oral, Nightly  clopidogrel (PLAVIX) tablet 75 mg, 75 mg, Oral, Daily  vitamin B-12 (CYANOCOBALAMIN) tablet 1,000 mcg, 1,000 mcg, Oral, Daily  donepezil (ARICEPT) tablet 10 mg, 10 mg, Oral, Nightly  furosemide (LASIX) tablet 20 mg, 20 mg, Oral, Nightly  cetirizine (ZYRTEC) tablet 5 mg, 5 mg, Oral, Daily  lisinopril (PRINIVIL;ZESTRIL) tablet 40 mg, 40 mg, Oral, Daily  montelukast (SINGULAIR) tablet 10 mg, 10 mg, Oral,

## 2018-12-11 LAB
ANION GAP SERPL CALCULATED.3IONS-SCNC: 20 MMOL/L (ref 3–16)
ANISOCYTOSIS: ABNORMAL
BASOPHILS ABSOLUTE: 0 K/UL (ref 0–0.2)
BASOPHILS RELATIVE PERCENT: 0 %
BUN BLDV-MCNC: 16 MG/DL (ref 7–20)
CALCIUM SERPL-MCNC: 9.6 MG/DL (ref 8.3–10.6)
CHLORIDE BLD-SCNC: 104 MMOL/L (ref 99–110)
CO2: 13 MMOL/L (ref 21–32)
CREAT SERPL-MCNC: 0.7 MG/DL (ref 0.6–1.2)
EOSINOPHILS ABSOLUTE: 0.1 K/UL (ref 0–0.6)
EOSINOPHILS RELATIVE PERCENT: 1 %
GFR AFRICAN AMERICAN: >60
GFR NON-AFRICAN AMERICAN: >60
GLUCOSE BLD-MCNC: 107 MG/DL (ref 70–99)
GLUCOSE BLD-MCNC: 77 MG/DL (ref 70–99)
GLUCOSE BLD-MCNC: 82 MG/DL (ref 70–99)
HCT VFR BLD CALC: 40.3 % (ref 36–48)
HEMOGLOBIN: 13.2 G/DL (ref 12–16)
LYMPHOCYTES ABSOLUTE: 1.6 K/UL (ref 1–5.1)
LYMPHOCYTES RELATIVE PERCENT: 25 %
MCH RBC QN AUTO: 30.8 PG (ref 26–34)
MCHC RBC AUTO-ENTMCNC: 32.7 G/DL (ref 31–36)
MCV RBC AUTO: 94.3 FL (ref 80–100)
MONOCYTES ABSOLUTE: 0.3 K/UL (ref 0–1.3)
MONOCYTES RELATIVE PERCENT: 5 %
NEUTROPHILS ABSOLUTE: 4.4 K/UL (ref 1.7–7.7)
NEUTROPHILS RELATIVE PERCENT: 69 %
PDW BLD-RTO: 14.2 % (ref 12.4–15.4)
PERFORMED ON: ABNORMAL
PERFORMED ON: NORMAL
PLATELET # BLD: 158 K/UL (ref 135–450)
PLATELET SLIDE REVIEW: ADEQUATE
PMV BLD AUTO: 9.8 FL (ref 5–10.5)
POIKILOCYTES: ABNORMAL
POLYCHROMASIA: ABNORMAL
POTASSIUM REFLEX MAGNESIUM: 3.8 MMOL/L (ref 3.5–5.1)
RBC # BLD: 4.28 M/UL (ref 4–5.2)
SLIDE REVIEW: ABNORMAL
SODIUM BLD-SCNC: 137 MMOL/L (ref 136–145)
WBC # BLD: 6.4 K/UL (ref 4–11)

## 2018-12-11 PROCEDURE — 1240000000 HC EMOTIONAL WELLNESS R&B

## 2018-12-11 PROCEDURE — 99233 SBSQ HOSP IP/OBS HIGH 50: CPT | Performed by: PSYCHIATRY & NEUROLOGY

## 2018-12-11 PROCEDURE — 80048 BASIC METABOLIC PNL TOTAL CA: CPT

## 2018-12-11 PROCEDURE — 36415 COLL VENOUS BLD VENIPUNCTURE: CPT

## 2018-12-11 PROCEDURE — 6370000000 HC RX 637 (ALT 250 FOR IP): Performed by: PHYSICIAN ASSISTANT

## 2018-12-11 PROCEDURE — 6370000000 HC RX 637 (ALT 250 FOR IP): Performed by: NURSE PRACTITIONER

## 2018-12-11 PROCEDURE — 85025 COMPLETE CBC W/AUTO DIFF WBC: CPT

## 2018-12-11 RX ORDER — ESCITALOPRAM OXALATE 10 MG/1
5 TABLET ORAL DAILY
Status: DISCONTINUED | OUTPATIENT
Start: 2018-12-11 | End: 2018-12-17 | Stop reason: HOSPADM

## 2018-12-11 RX ADMIN — ACETAMINOPHEN 650 MG: 325 TABLET ORAL at 04:29

## 2018-12-11 RX ADMIN — ASPIRIN 81 MG 81 MG: 81 TABLET ORAL at 09:02

## 2018-12-11 ASSESSMENT — PAIN SCALES - GENERAL: PAINLEVEL_OUTOF10: 7

## 2018-12-11 NOTE — PROGRESS NOTES
Patient at desk saying that she did not want lexapro and declined information regarding new medication.

## 2018-12-12 PROCEDURE — 99233 SBSQ HOSP IP/OBS HIGH 50: CPT | Performed by: PSYCHIATRY & NEUROLOGY

## 2018-12-12 PROCEDURE — 6370000000 HC RX 637 (ALT 250 FOR IP): Performed by: PSYCHIATRY & NEUROLOGY

## 2018-12-12 PROCEDURE — 6370000000 HC RX 637 (ALT 250 FOR IP): Performed by: PHYSICIAN ASSISTANT

## 2018-12-12 PROCEDURE — 1240000000 HC EMOTIONAL WELLNESS R&B

## 2018-12-12 PROCEDURE — 99231 SBSQ HOSP IP/OBS SF/LOW 25: CPT | Performed by: PHYSICIAN ASSISTANT

## 2018-12-12 RX ORDER — FUROSEMIDE 20 MG/1
20 TABLET ORAL DAILY
Status: DISCONTINUED | OUTPATIENT
Start: 2018-12-13 | End: 2018-12-17 | Stop reason: HOSPADM

## 2018-12-12 RX ORDER — DIPHENHYDRAMINE HCL 25 MG
25 TABLET ORAL EVERY 6 HOURS PRN
Status: DISCONTINUED | OUTPATIENT
Start: 2018-12-12 | End: 2018-12-17 | Stop reason: HOSPADM

## 2018-12-12 RX ADMIN — CYANOCOBALAMIN TAB 500 MCG 1000 MCG: 500 TAB at 09:36

## 2018-12-12 RX ADMIN — ATORVASTATIN CALCIUM 80 MG: 40 TABLET, FILM COATED ORAL at 20:46

## 2018-12-12 RX ADMIN — CLOPIDOGREL BISULFATE 75 MG: 75 TABLET ORAL at 09:36

## 2018-12-12 RX ADMIN — TOPIRAMATE 100 MG: 100 TABLET, FILM COATED ORAL at 09:36

## 2018-12-12 RX ADMIN — FAMOTIDINE 20 MG: 20 TABLET, FILM COATED ORAL at 20:47

## 2018-12-12 RX ADMIN — PANTOPRAZOLE SODIUM 40 MG: 40 TABLET, DELAYED RELEASE ORAL at 09:36

## 2018-12-12 RX ADMIN — ASPIRIN 81 MG 81 MG: 81 TABLET ORAL at 09:35

## 2018-12-12 RX ADMIN — TOPIRAMATE 100 MG: 100 TABLET, FILM COATED ORAL at 20:46

## 2018-12-12 RX ADMIN — DIPHENHYDRAMINE HCL 25 MG: 25 TABLET ORAL at 20:46

## 2018-12-12 RX ADMIN — DONEPEZIL HYDROCHLORIDE 10 MG: 5 TABLET, FILM COATED ORAL at 20:46

## 2018-12-12 RX ADMIN — ESCITALOPRAM OXALATE 5 MG: 10 TABLET ORAL at 09:36

## 2018-12-12 RX ADMIN — RANOLAZINE 500 MG: 500 TABLET, FILM COATED, EXTENDED RELEASE ORAL at 09:35

## 2018-12-12 RX ADMIN — CETIRIZINE HYDROCHLORIDE 5 MG: 10 TABLET ORAL at 09:35

## 2018-12-12 RX ADMIN — FAMOTIDINE 20 MG: 20 TABLET, FILM COATED ORAL at 09:35

## 2018-12-12 RX ADMIN — PANTOPRAZOLE SODIUM 40 MG: 40 TABLET, DELAYED RELEASE ORAL at 20:47

## 2018-12-12 RX ADMIN — MONTELUKAST SODIUM 10 MG: 10 TABLET, FILM COATED ORAL at 20:47

## 2018-12-12 RX ADMIN — LISINOPRIL 40 MG: 20 TABLET ORAL at 09:36

## 2018-12-12 NOTE — PLAN OF CARE
Problem: Depressive Behavior With or Without Suicide Precautions:  Goal: Able to verbalize and/or display a decrease in depressive symptoms  Able to verbalize and/or display a decrease in depressive symptoms   Outcome: Ongoing  Patient offered breakfast tray, declined to eat any breakfast.  Patient states that she is not hungry. Patient states the last time she ate was \"Friday at lunchtime, I think. \"  Educated and encouraged to start eating or at least drink fluids. Offered Ensure however patient declined. Writer asked patient if there was anything that sounded good enough to eat and she could not think of anything. Offered fresh fruit, ice cream, ensure, Peruvian toast and warren. Patient notified that she must continue to drink fluids. Patient verbalized understanding.

## 2018-12-13 PROCEDURE — 6370000000 HC RX 637 (ALT 250 FOR IP): Performed by: NURSE PRACTITIONER

## 2018-12-13 PROCEDURE — 6370000000 HC RX 637 (ALT 250 FOR IP): Performed by: PHYSICIAN ASSISTANT

## 2018-12-13 PROCEDURE — 99233 SBSQ HOSP IP/OBS HIGH 50: CPT | Performed by: PSYCHIATRY & NEUROLOGY

## 2018-12-13 PROCEDURE — 1240000000 HC EMOTIONAL WELLNESS R&B

## 2018-12-13 RX ADMIN — TOPIRAMATE 100 MG: 100 TABLET, FILM COATED ORAL at 20:33

## 2018-12-13 RX ADMIN — ACETAMINOPHEN 650 MG: 325 TABLET ORAL at 20:32

## 2018-12-13 RX ADMIN — PANTOPRAZOLE SODIUM 40 MG: 40 TABLET, DELAYED RELEASE ORAL at 20:32

## 2018-12-13 RX ADMIN — DONEPEZIL HYDROCHLORIDE 10 MG: 5 TABLET, FILM COATED ORAL at 20:32

## 2018-12-13 RX ADMIN — RANOLAZINE 500 MG: 500 TABLET, FILM COATED, EXTENDED RELEASE ORAL at 20:33

## 2018-12-13 RX ADMIN — ATORVASTATIN CALCIUM 80 MG: 40 TABLET, FILM COATED ORAL at 20:33

## 2018-12-13 RX ADMIN — MONTELUKAST SODIUM 10 MG: 10 TABLET, FILM COATED ORAL at 20:33

## 2018-12-13 RX ADMIN — DIPHENHYDRAMINE HCL 25 MG: 25 TABLET ORAL at 17:55

## 2018-12-13 RX ADMIN — FAMOTIDINE 20 MG: 20 TABLET, FILM COATED ORAL at 20:33

## 2018-12-13 ASSESSMENT — PAIN SCALES - GENERAL
PAINLEVEL_OUTOF10: 0
PAINLEVEL_OUTOF10: 7

## 2018-12-13 NOTE — PROGRESS NOTES
% Final    Basophils % 12/11/2018 0.0  % Final    Neutrophils # 12/11/2018 4.4  1.7 - 7.7 K/uL Final    Lymphocytes # 12/11/2018 1.6  1.0 - 5.1 K/uL Final    Monocytes # 12/11/2018 0.3  0.0 - 1.3 K/uL Final    Eosinophils # 12/11/2018 0.1  0.0 - 0.6 K/uL Final    Basophils # 12/11/2018 0.0  0.0 - 0.2 K/uL Final    Anisocytosis 12/11/2018 Occasional*  Final    Polychromasia 12/11/2018 Occasional*  Final    Poikilocytes 12/11/2018 1+*  Final    Sodium 12/11/2018 137  136 - 145 mmol/L Final    Potassium reflex Magnesium 12/11/2018 3.8  3.5 - 5.1 mmol/L Final    Chloride 12/11/2018 104  99 - 110 mmol/L Final    CO2 12/11/2018 13* 21 - 32 mmol/L Final    Anion Gap 12/11/2018 20* 3 - 16 Final    Glucose 12/11/2018 82  70 - 99 mg/dL Final    BUN 12/11/2018 16  7 - 20 mg/dL Final    CREATININE 12/11/2018 0.7  0.6 - 1.2 mg/dL Final    GFR Non- 12/11/2018 >60  >60 Final    Comment: >60 mL/min/1.73m2 EGFR, calc. for ages 25 and older using the  MDRD formula (not corrected for weight), is valid for stable  renal function.  GFR  12/11/2018 >60  >60 Final    Comment: Chronic Kidney Disease: less than 60 ml/min/1.73 sq.m. Kidney Failure: less than 15 ml/min/1.73 sq.m. Results valid for patients 18 years and older.       Calcium 12/11/2018 9.6  8.3 - 10.6 mg/dL Final    POC Glucose 12/11/2018 77  70 - 99 mg/dl Final    Performed on 12/11/2018 ACCU-CHEK   Final    POC Glucose 12/11/2018 107* 70 - 99 mg/dl Final    Performed on 12/11/2018 ACCU-CHEK   Final            Medications  Current Facility-Administered Medications: diphenhydrAMINE (BENADRYL) tablet 25 mg, 25 mg, Oral, Q6H PRN  furosemide (LASIX) tablet 20 mg, 20 mg, Oral, Daily  escitalopram (LEXAPRO) tablet 5 mg, 5 mg, Oral, Daily  medicated lip balm (BLISTEX/CARMEX) stick, , Topical, PRN  acetaminophen (TYLENOL) tablet 650 mg, 650 mg, Oral, Q4H PRN  magnesium hydroxide (MILK OF MAGNESIA) 400 MG/5ML

## 2018-12-14 LAB
TROPONIN: <0.01 NG/ML
TROPONIN: <0.01 NG/ML

## 2018-12-14 PROCEDURE — 36415 COLL VENOUS BLD VENIPUNCTURE: CPT

## 2018-12-14 PROCEDURE — 93005 ELECTROCARDIOGRAM TRACING: CPT | Performed by: PSYCHIATRY & NEUROLOGY

## 2018-12-14 PROCEDURE — 6370000000 HC RX 637 (ALT 250 FOR IP)

## 2018-12-14 PROCEDURE — 1240000000 HC EMOTIONAL WELLNESS R&B

## 2018-12-14 PROCEDURE — 6370000000 HC RX 637 (ALT 250 FOR IP): Performed by: PHYSICIAN ASSISTANT

## 2018-12-14 PROCEDURE — 99233 SBSQ HOSP IP/OBS HIGH 50: CPT | Performed by: PSYCHIATRY & NEUROLOGY

## 2018-12-14 PROCEDURE — 84484 ASSAY OF TROPONIN QUANT: CPT

## 2018-12-14 PROCEDURE — 6370000000 HC RX 637 (ALT 250 FOR IP): Performed by: PSYCHIATRY & NEUROLOGY

## 2018-12-14 RX ORDER — NITROGLYCERIN 0.4 MG/1
0.4 TABLET SUBLINGUAL EVERY 5 MIN PRN
Status: DISCONTINUED | OUTPATIENT
Start: 2018-12-14 | End: 2018-12-14

## 2018-12-14 RX ORDER — NITROGLYCERIN 0.4 MG/1
0.4 TABLET SUBLINGUAL EVERY 5 MIN PRN
Status: DISCONTINUED | OUTPATIENT
Start: 2018-12-14 | End: 2018-12-17 | Stop reason: HOSPADM

## 2018-12-14 RX ORDER — NITROGLYCERIN 0.4 MG/1
TABLET SUBLINGUAL
Status: COMPLETED
Start: 2018-12-14 | End: 2018-12-14

## 2018-12-14 RX ADMIN — TOPIRAMATE 100 MG: 100 TABLET, FILM COATED ORAL at 09:35

## 2018-12-14 RX ADMIN — PANTOPRAZOLE SODIUM 40 MG: 40 TABLET, DELAYED RELEASE ORAL at 20:01

## 2018-12-14 RX ADMIN — VITAMIN D, TAB 1000IU (100/BT) 1000 UNITS: 25 TAB at 12:28

## 2018-12-14 RX ADMIN — NITROGLYCERIN: 0.4 TABLET SUBLINGUAL at 16:50

## 2018-12-14 RX ADMIN — ESCITALOPRAM OXALATE 5 MG: 10 TABLET ORAL at 09:07

## 2018-12-14 RX ADMIN — RANOLAZINE 500 MG: 500 TABLET, FILM COATED, EXTENDED RELEASE ORAL at 09:08

## 2018-12-14 RX ADMIN — CLOPIDOGREL BISULFATE 75 MG: 75 TABLET ORAL at 09:07

## 2018-12-14 RX ADMIN — LISINOPRIL 40 MG: 20 TABLET ORAL at 09:36

## 2018-12-14 RX ADMIN — RANOLAZINE 500 MG: 500 TABLET, FILM COATED, EXTENDED RELEASE ORAL at 20:01

## 2018-12-14 RX ADMIN — FUROSEMIDE 20 MG: 20 TABLET ORAL at 09:07

## 2018-12-14 RX ADMIN — CYANOCOBALAMIN TAB 500 MCG 1000 MCG: 500 TAB at 09:08

## 2018-12-14 RX ADMIN — DONEPEZIL HYDROCHLORIDE 10 MG: 5 TABLET, FILM COATED ORAL at 20:01

## 2018-12-14 RX ADMIN — CETIRIZINE HYDROCHLORIDE 5 MG: 10 TABLET ORAL at 09:07

## 2018-12-14 RX ADMIN — TOPIRAMATE 100 MG: 100 TABLET, FILM COATED ORAL at 20:01

## 2018-12-14 RX ADMIN — ATORVASTATIN CALCIUM 80 MG: 40 TABLET, FILM COATED ORAL at 20:01

## 2018-12-14 RX ADMIN — MONTELUKAST SODIUM 10 MG: 10 TABLET, FILM COATED ORAL at 20:01

## 2018-12-14 RX ADMIN — FAMOTIDINE 20 MG: 20 TABLET, FILM COATED ORAL at 20:01

## 2018-12-14 RX ADMIN — PANTOPRAZOLE SODIUM 40 MG: 40 TABLET, DELAYED RELEASE ORAL at 09:36

## 2018-12-14 RX ADMIN — ASPIRIN 81 MG 81 MG: 81 TABLET ORAL at 09:07

## 2018-12-14 RX ADMIN — FAMOTIDINE 20 MG: 20 TABLET, FILM COATED ORAL at 09:07

## 2018-12-14 NOTE — PLAN OF CARE
Problem: Nutrition  Goal: Optimal nutrition therapy  Outcome: Ongoing  Nutrition Problem: Inadequate oral intake  Intervention: Food and/or Nutrient Delivery: Continue current diet  Nutritional Goals: patient will consume 50% or greater of meals on general, vegetarian diet order x 3 meals per day; weight will remain stable during remainder of admission

## 2018-12-14 NOTE — PROGRESS NOTES
Department of Psychiatry  Attending Progress Note  Chief Complaint: depressed mood. Leonel Molina is more visible on the unit. She is now eating well and appears brighter affectively. Insight remains limited but she is engaged and pleasant. She denied any further SI and is hopeful to return to her apt soon. Oriented today which is in contrast to yesterday  Patient's chart was reviewed and collaborated with  about the treatment plan. SUBJECTIVE:    Patient is feeling better. Suicidal ideation:  denies suicidal ideation. Patient does not have medication side effects. ROS: Patient has new complaints: no  Sleeping adequately:  Yes   Appetite adequate: Yes  Attending groups: No:   Visitors:No    OBJECTIVE    Physical  VITALS:  BP (!) 149/88   Pulse 76   Temp 98.1 °F (36.7 °C) (Oral)   Resp 16   Ht 5' 2\" (1.575 m)   Wt 200 lb (90.7 kg)   SpO2 97%   BMI 36.58 kg/m²     Mental Status Examination:  Patients appearance was street clothes. Thoughts are [place]. Homicidal ideations none. No abnormal movements, tics or mannerisms. Memory intact [place] 0. Concentration Fair. Alert and oriented X 4. Insight and Judgement impaired insight. Patient was cooperative.  Patient gait abnormal. Mood constricted, affect depressed affect Hallucinations Absent, suicidal ideations no specific plan to harm self Speech normal volume  Data  Labs:   Admission on 12/07/2018   Component Date Value Ref Range Status    Ventricular Rate 12/07/2018 87  BPM Final    Atrial Rate 12/07/2018 87  BPM Final    P-R Interval 12/07/2018 190  ms Final    QRS Duration 12/07/2018 84  ms Final    Q-T Interval 12/07/2018 364  ms Final    QTc Calculation (Bazett) 12/07/2018 438  ms Final    P Axis 12/07/2018 43  degrees Final    R Axis 12/07/2018 15  degrees Final    T Axis 12/07/2018 72  degrees Final    Diagnosis 12/07/2018 Sinus rhythm with occasional Premature ventricular complexesNonspecific ST abnormalityWhen compared with ECG of

## 2018-12-14 NOTE — PLAN OF CARE
Problem: Depressive Behavior With or Without Suicide Precautions:  Goal: Participates in care planning  Participates in care planning   Outcome: Ongoing  Patient eating more today. Diet was changed to vegetarian per patient request. Patient compliant with medication. Will continue to monitor.

## 2018-12-15 LAB
EKG ATRIAL RATE: 71 BPM
EKG ATRIAL RATE: 72 BPM
EKG DIAGNOSIS: NORMAL
EKG DIAGNOSIS: NORMAL
EKG P AXIS: 48 DEGREES
EKG P AXIS: 60 DEGREES
EKG P-R INTERVAL: 174 MS
EKG P-R INTERVAL: 182 MS
EKG Q-T INTERVAL: 414 MS
EKG Q-T INTERVAL: 424 MS
EKG QRS DURATION: 84 MS
EKG QRS DURATION: 88 MS
EKG QTC CALCULATION (BAZETT): 453 MS
EKG QTC CALCULATION (BAZETT): 460 MS
EKG R AXIS: 11 DEGREES
EKG R AXIS: 36 DEGREES
EKG T AXIS: 82 DEGREES
EKG T AXIS: 91 DEGREES
EKG VENTRICULAR RATE: 71 BPM
EKG VENTRICULAR RATE: 72 BPM
TROPONIN: <0.01 NG/ML

## 2018-12-15 PROCEDURE — 84484 ASSAY OF TROPONIN QUANT: CPT

## 2018-12-15 PROCEDURE — 93005 ELECTROCARDIOGRAM TRACING: CPT | Performed by: PSYCHIATRY & NEUROLOGY

## 2018-12-15 PROCEDURE — 1240000000 HC EMOTIONAL WELLNESS R&B

## 2018-12-15 PROCEDURE — 99233 SBSQ HOSP IP/OBS HIGH 50: CPT | Performed by: PSYCHIATRY & NEUROLOGY

## 2018-12-15 PROCEDURE — 36415 COLL VENOUS BLD VENIPUNCTURE: CPT

## 2018-12-15 PROCEDURE — 6370000000 HC RX 637 (ALT 250 FOR IP): Performed by: PSYCHIATRY & NEUROLOGY

## 2018-12-15 PROCEDURE — 93010 ELECTROCARDIOGRAM REPORT: CPT | Performed by: INTERNAL MEDICINE

## 2018-12-15 PROCEDURE — 6370000000 HC RX 637 (ALT 250 FOR IP): Performed by: PHYSICIAN ASSISTANT

## 2018-12-15 RX ADMIN — FAMOTIDINE 20 MG: 20 TABLET, FILM COATED ORAL at 10:32

## 2018-12-15 RX ADMIN — PANTOPRAZOLE SODIUM 40 MG: 40 TABLET, DELAYED RELEASE ORAL at 20:14

## 2018-12-15 RX ADMIN — TOPIRAMATE 100 MG: 100 TABLET, FILM COATED ORAL at 10:31

## 2018-12-15 RX ADMIN — RANOLAZINE 500 MG: 500 TABLET, FILM COATED, EXTENDED RELEASE ORAL at 10:39

## 2018-12-15 RX ADMIN — CETIRIZINE HYDROCHLORIDE 5 MG: 10 TABLET ORAL at 10:30

## 2018-12-15 RX ADMIN — CLOPIDOGREL BISULFATE 75 MG: 75 TABLET ORAL at 10:31

## 2018-12-15 RX ADMIN — DONEPEZIL HYDROCHLORIDE 10 MG: 5 TABLET, FILM COATED ORAL at 20:14

## 2018-12-15 RX ADMIN — RANOLAZINE 500 MG: 500 TABLET, FILM COATED, EXTENDED RELEASE ORAL at 20:17

## 2018-12-15 RX ADMIN — MONTELUKAST SODIUM 10 MG: 10 TABLET, FILM COATED ORAL at 20:14

## 2018-12-15 RX ADMIN — FAMOTIDINE 20 MG: 20 TABLET, FILM COATED ORAL at 20:14

## 2018-12-15 RX ADMIN — ESCITALOPRAM OXALATE 5 MG: 10 TABLET ORAL at 10:31

## 2018-12-15 RX ADMIN — CYANOCOBALAMIN TAB 500 MCG 1000 MCG: 500 TAB at 10:31

## 2018-12-15 RX ADMIN — VITAMIN D, TAB 1000IU (100/BT) 1000 UNITS: 25 TAB at 09:40

## 2018-12-15 RX ADMIN — ASPIRIN 81 MG 81 MG: 81 TABLET ORAL at 10:31

## 2018-12-15 RX ADMIN — FUROSEMIDE 20 MG: 20 TABLET ORAL at 10:32

## 2018-12-15 RX ADMIN — ATORVASTATIN CALCIUM 80 MG: 40 TABLET, FILM COATED ORAL at 20:14

## 2018-12-15 RX ADMIN — TOPIRAMATE 100 MG: 100 TABLET, FILM COATED ORAL at 20:14

## 2018-12-15 RX ADMIN — PANTOPRAZOLE SODIUM 40 MG: 40 TABLET, DELAYED RELEASE ORAL at 10:30

## 2018-12-15 RX ADMIN — LISINOPRIL 40 MG: 20 TABLET ORAL at 10:32

## 2018-12-15 NOTE — PROGRESS NOTES
12/8/2018 7:21:43 AM   Final    WBC 12/07/2018 5.0  4.0 - 11.0 K/uL Final    RBC 12/07/2018 3.83* 4.00 - 5.20 M/uL Final    Hemoglobin 12/07/2018 11.9* 12.0 - 16.0 g/dL Final    Hematocrit 12/07/2018 35.6* 36.0 - 48.0 % Final    MCV 12/07/2018 93.0  80.0 - 100.0 fL Final    MCH 12/07/2018 31.0  26.0 - 34.0 pg Final    MCHC 12/07/2018 33.4  31.0 - 36.0 g/dL Final    RDW 12/07/2018 14.6  12.4 - 15.4 % Final    Platelets 14/83/8751 183  135 - 450 K/uL Final    MPV 12/07/2018 8.2  5.0 - 10.5 fL Final    Neutrophils % 12/07/2018 54.0  % Final    Lymphocytes % 12/07/2018 31.7  % Final    Monocytes % 12/07/2018 10.9  % Final    Eosinophils % 12/07/2018 3.2  % Final    Basophils % 12/07/2018 0.2  % Final    Neutrophils # 12/07/2018 2.7  1.7 - 7.7 K/uL Final    Lymphocytes # 12/07/2018 1.6  1.0 - 5.1 K/uL Final    Monocytes # 12/07/2018 0.6  0.0 - 1.3 K/uL Final    Eosinophils # 12/07/2018 0.2  0.0 - 0.6 K/uL Final    Basophils # 12/07/2018 0.0  0.0 - 0.2 K/uL Final    Sodium 12/07/2018 141  136 - 145 mmol/L Final    Potassium reflex Magnesium 12/07/2018 3.7  3.5 - 5.1 mmol/L Final    Chloride 12/07/2018 107  99 - 110 mmol/L Final    CO2 12/07/2018 23  21 - 32 mmol/L Final    Anion Gap 12/07/2018 11  3 - 16 Final    Glucose 12/07/2018 120* 70 - 99 mg/dL Final    BUN 12/07/2018 18  7 - 20 mg/dL Final    CREATININE 12/07/2018 1.1  0.6 - 1.2 mg/dL Final    GFR Non- 12/07/2018 49* >60 Final    Comment: >60 mL/min/1.73m2 EGFR, calc. for ages 25 and older using the  MDRD formula (not corrected for weight), is valid for stable  renal function.  GFR  12/07/2018 59* >60 Final    Comment: Chronic Kidney Disease: less than 60 ml/min/1.73 sq.m. Kidney Failure: less than 15 ml/min/1.73 sq.m. Results valid for patients 18 years and older.       Calcium 12/07/2018 9.5  8.3 - 10.6 mg/dL Final    Total Protein 12/07/2018 6.6  6.4 - 8.2 g/dL Final    Alb 12/07/2018  Eosinophils # 12/11/2018 0.1  0.0 - 0.6 K/uL Final    Basophils # 12/11/2018 0.0  0.0 - 0.2 K/uL Final    Anisocytosis 12/11/2018 Occasional*  Final    Polychromasia 12/11/2018 Occasional*  Final    Poikilocytes 12/11/2018 1+*  Final    Sodium 12/11/2018 137  136 - 145 mmol/L Final    Potassium reflex Magnesium 12/11/2018 3.8  3.5 - 5.1 mmol/L Final    Chloride 12/11/2018 104  99 - 110 mmol/L Final    CO2 12/11/2018 13* 21 - 32 mmol/L Final    Anion Gap 12/11/2018 20* 3 - 16 Final    Glucose 12/11/2018 82  70 - 99 mg/dL Final    BUN 12/11/2018 16  7 - 20 mg/dL Final    CREATININE 12/11/2018 0.7  0.6 - 1.2 mg/dL Final    GFR Non- 12/11/2018 >60  >60 Final    Comment: >60 mL/min/1.73m2 EGFR, calc. for ages 25 and older using the  MDRD formula (not corrected for weight), is valid for stable  renal function.  GFR  12/11/2018 >60  >60 Final    Comment: Chronic Kidney Disease: less than 60 ml/min/1.73 sq.m. Kidney Failure: less than 15 ml/min/1.73 sq.m. Results valid for patients 18 years and older.       Calcium 12/11/2018 9.6  8.3 - 10.6 mg/dL Final    POC Glucose 12/11/2018 77  70 - 99 mg/dl Final    Performed on 12/11/2018 ACCU-CHEK   Final    POC Glucose 12/11/2018 107* 70 - 99 mg/dl Final    Performed on 12/11/2018 ACCU-CHEK   Final    Ventricular Rate 12/14/2018 71  BPM Final    Atrial Rate 12/14/2018 71  BPM Final    P-R Interval 12/14/2018 174  ms Final    QRS Duration 12/14/2018 88  ms Final    Q-T Interval 12/14/2018 424  ms Final    QTc Calculation (Bazett) 12/14/2018 460  ms Final    P Axis 12/14/2018 60  degrees Final    R Axis 12/14/2018 36  degrees Final    T Axis 12/14/2018 91  degrees Final    Diagnosis 12/14/2018 Normal sinus rhythmNonspecific ST and T wave abnormalityAbnormal ECGWhen compared with ECG of 07-DEC-2018 21:01,No significant changeConfirmed by Mariam Wright MD, Tonya Gowers ([id number]) on 12/15/2018 1:17:20 PM   [facility]

## 2018-12-16 PROCEDURE — 6370000000 HC RX 637 (ALT 250 FOR IP): Performed by: NURSE PRACTITIONER

## 2018-12-16 PROCEDURE — 6370000000 HC RX 637 (ALT 250 FOR IP): Performed by: PHYSICIAN ASSISTANT

## 2018-12-16 PROCEDURE — 6370000000 HC RX 637 (ALT 250 FOR IP): Performed by: PSYCHIATRY & NEUROLOGY

## 2018-12-16 PROCEDURE — 1240000000 HC EMOTIONAL WELLNESS R&B

## 2018-12-16 RX ORDER — CARBOXYMETHYLCELLULOSE SODIUM 10 MG/ML
1 GEL OPHTHALMIC EVERY 4 HOURS PRN
Status: DISCONTINUED | OUTPATIENT
Start: 2018-12-16 | End: 2018-12-17 | Stop reason: HOSPADM

## 2018-12-16 RX ORDER — CETIRIZINE HYDROCHLORIDE 10 MG/1
10 TABLET ORAL DAILY
Status: DISCONTINUED | OUTPATIENT
Start: 2018-12-17 | End: 2018-12-17 | Stop reason: HOSPADM

## 2018-12-16 RX ADMIN — RANOLAZINE 500 MG: 500 TABLET, FILM COATED, EXTENDED RELEASE ORAL at 20:26

## 2018-12-16 RX ADMIN — LISINOPRIL 40 MG: 20 TABLET ORAL at 09:19

## 2018-12-16 RX ADMIN — CETIRIZINE HYDROCHLORIDE 5 MG: 10 TABLET ORAL at 09:19

## 2018-12-16 RX ADMIN — ACETAMINOPHEN 650 MG: 325 TABLET ORAL at 12:48

## 2018-12-16 RX ADMIN — MONTELUKAST SODIUM 10 MG: 10 TABLET, FILM COATED ORAL at 20:34

## 2018-12-16 RX ADMIN — DONEPEZIL HYDROCHLORIDE 10 MG: 5 TABLET, FILM COATED ORAL at 20:25

## 2018-12-16 RX ADMIN — PANTOPRAZOLE SODIUM 40 MG: 40 TABLET, DELAYED RELEASE ORAL at 09:19

## 2018-12-16 RX ADMIN — ESCITALOPRAM OXALATE 5 MG: 10 TABLET ORAL at 09:19

## 2018-12-16 RX ADMIN — TOPIRAMATE 100 MG: 100 TABLET, FILM COATED ORAL at 20:26

## 2018-12-16 RX ADMIN — ASPIRIN 81 MG 81 MG: 81 TABLET ORAL at 09:19

## 2018-12-16 RX ADMIN — FUROSEMIDE 20 MG: 20 TABLET ORAL at 09:19

## 2018-12-16 RX ADMIN — FAMOTIDINE 20 MG: 20 TABLET, FILM COATED ORAL at 20:26

## 2018-12-16 RX ADMIN — CYANOCOBALAMIN TAB 500 MCG 1000 MCG: 500 TAB at 09:18

## 2018-12-16 RX ADMIN — FAMOTIDINE 20 MG: 20 TABLET, FILM COATED ORAL at 09:18

## 2018-12-16 RX ADMIN — PANTOPRAZOLE SODIUM 40 MG: 40 TABLET, DELAYED RELEASE ORAL at 20:26

## 2018-12-16 RX ADMIN — RANOLAZINE 500 MG: 500 TABLET, FILM COATED, EXTENDED RELEASE ORAL at 09:18

## 2018-12-16 RX ADMIN — CLOPIDOGREL BISULFATE 75 MG: 75 TABLET ORAL at 09:18

## 2018-12-16 RX ADMIN — VITAMIN D, TAB 1000IU (100/BT) 1000 UNITS: 25 TAB at 09:18

## 2018-12-16 RX ADMIN — ATORVASTATIN CALCIUM 80 MG: 40 TABLET, FILM COATED ORAL at 20:26

## 2018-12-16 RX ADMIN — TOPIRAMATE 100 MG: 100 TABLET, FILM COATED ORAL at 09:18

## 2018-12-16 ASSESSMENT — PAIN SCALES - GENERAL: PAINLEVEL_OUTOF10: 3

## 2018-12-17 VITALS
RESPIRATION RATE: 16 BRPM | OXYGEN SATURATION: 97 % | WEIGHT: 200 LBS | DIASTOLIC BLOOD PRESSURE: 82 MMHG | SYSTOLIC BLOOD PRESSURE: 142 MMHG | BODY MASS INDEX: 36.8 KG/M2 | HEIGHT: 62 IN | TEMPERATURE: 98.3 F | HEART RATE: 78 BPM

## 2018-12-17 PROCEDURE — 6370000000 HC RX 637 (ALT 250 FOR IP): Performed by: PHYSICIAN ASSISTANT

## 2018-12-17 PROCEDURE — 99239 HOSP IP/OBS DSCHRG MGMT >30: CPT | Performed by: PSYCHIATRY & NEUROLOGY

## 2018-12-17 PROCEDURE — 6370000000 HC RX 637 (ALT 250 FOR IP): Performed by: PSYCHIATRY & NEUROLOGY

## 2018-12-17 PROCEDURE — 5130000000 HC BRIDGE APPOINTMENT

## 2018-12-17 RX ORDER — ESCITALOPRAM OXALATE 5 MG/1
5 TABLET ORAL DAILY
Qty: 30 TABLET | Refills: 0 | Status: SHIPPED | OUTPATIENT
Start: 2018-12-18 | End: 2019-03-30

## 2018-12-17 RX ORDER — RANITIDINE 150 MG/1
150 TABLET ORAL 2 TIMES DAILY
Qty: 60 TABLET | Refills: 3 | Status: ON HOLD | OUTPATIENT
Start: 2018-12-17 | End: 2019-04-01 | Stop reason: HOSPADM

## 2018-12-17 RX ORDER — PANTOPRAZOLE SODIUM 40 MG/1
40 TABLET, DELAYED RELEASE ORAL 2 TIMES DAILY
Qty: 30 TABLET | Refills: 0 | Status: SHIPPED | OUTPATIENT
Start: 2018-12-17 | End: 2019-03-30

## 2018-12-17 RX ORDER — ATORVASTATIN CALCIUM 80 MG/1
80 TABLET, FILM COATED ORAL NIGHTLY
Qty: 30 TABLET | Refills: 3 | Status: SHIPPED | OUTPATIENT
Start: 2018-12-17 | End: 2019-03-30

## 2018-12-17 RX ORDER — TOPIRAMATE 25 MG/1
100 TABLET ORAL 2 TIMES DAILY
Qty: 60 TABLET | Refills: 3 | Status: SHIPPED | OUTPATIENT
Start: 2018-12-17 | End: 2019-03-30

## 2018-12-17 RX ORDER — DONEPEZIL HYDROCHLORIDE 10 MG/1
10 TABLET, FILM COATED ORAL NIGHTLY
Qty: 30 TABLET | Refills: 3 | Status: SHIPPED | OUTPATIENT
Start: 2018-12-17 | End: 2019-03-30

## 2018-12-17 RX ORDER — FAMOTIDINE 20 MG/1
20 TABLET, FILM COATED ORAL 2 TIMES DAILY
Qty: 60 TABLET | Refills: 0 | Status: SHIPPED | OUTPATIENT
Start: 2018-12-17 | End: 2019-03-30

## 2018-12-17 RX ORDER — MIRTAZAPINE 15 MG/1
15 TABLET, FILM COATED ORAL NIGHTLY
Qty: 30 TABLET | Refills: 0 | Status: SHIPPED | OUTPATIENT
Start: 2018-12-17 | End: 2019-03-30

## 2018-12-17 RX ADMIN — ASPIRIN 81 MG 81 MG: 81 TABLET ORAL at 08:54

## 2018-12-17 RX ADMIN — VITAMIN D, TAB 1000IU (100/BT) 1000 UNITS: 25 TAB at 08:54

## 2018-12-17 RX ADMIN — PANTOPRAZOLE SODIUM 40 MG: 40 TABLET, DELAYED RELEASE ORAL at 08:48

## 2018-12-17 RX ADMIN — FUROSEMIDE 20 MG: 20 TABLET ORAL at 08:51

## 2018-12-17 RX ADMIN — TOPIRAMATE 100 MG: 100 TABLET, FILM COATED ORAL at 08:51

## 2018-12-17 RX ADMIN — CYANOCOBALAMIN TAB 500 MCG 1000 MCG: 500 TAB at 08:54

## 2018-12-17 RX ADMIN — RANOLAZINE 500 MG: 500 TABLET, FILM COATED, EXTENDED RELEASE ORAL at 08:54

## 2018-12-17 RX ADMIN — LISINOPRIL 40 MG: 20 TABLET ORAL at 11:54

## 2018-12-17 RX ADMIN — CETIRIZINE HYDROCHLORIDE 10 MG: 10 TABLET ORAL at 08:54

## 2018-12-17 RX ADMIN — ESCITALOPRAM OXALATE 5 MG: 10 TABLET ORAL at 08:51

## 2018-12-17 RX ADMIN — CLOPIDOGREL BISULFATE 75 MG: 75 TABLET ORAL at 08:51

## 2018-12-17 RX ADMIN — FAMOTIDINE 20 MG: 20 TABLET, FILM COATED ORAL at 08:54

## 2018-12-17 NOTE — PLAN OF CARE
Problem: Depressive Behavior With or Without Suicide Precautions:  Goal: Absence of self-harm  Absence of self-harm   Outcome: Ongoing  Patient denies SI/HI/A/V/H. Patient watching a movie with peers @ the beginning of the shift. Patient refused to attended wrap up group. Patient took HS medications. Patient resting quietly in bed. No c/o's voiced @ present.

## 2018-12-17 NOTE — BH NOTE
5 [facility]  Initial Interdisciplinary Treatment Plan NOTE    Review Date & Time: 12/08/2018  1100     Patient was not in treatment team    Admission Type:   Admission Type: Involuntary    Reason for admission:  Reason for Admission: depressed and SI      Estimated Length of Stay Update:  1-3 days   Estimated Discharge Date Update: 1-3 days     PATIENT STRENGTHS:  Patient Strengths Strengths: Communication, Medication Compliance  Patient Strengths and Limitations:Limitations: Lacks leisure interests, Multiple barriers to leisure interests, Tendency to isolate self, General negative or hopeless attitude about future/recovery, Difficult relationships / poor social skills  Addictive Behavior:Addictive Behavior  In the past 3 months, have you felt or has someone told you that you have a problem with:  : None  Do you have a history of Chemical Use?: No  Do you have a history of Alcohol Use?: No  Do you have a history of Street Drug Abuse?: No  Histroy of Prescripton Drug Abuse?: No  Medical Problems:  Past Medical History:   Diagnosis Date    Anxiety     Asthma     Bipolar 1 disorder ([facility] [address]     CAD (coronary artery disease)     Depression     GERD (gastroesophageal reflux disease)     Suicidal behavior     hx: 5 years ago and 4 other times 10 years ago    Thyroid disease        EDUCATION:   Learner Progress Toward Treatment Goals: Reviewed goals and plan of care    Method: Individual    Outcome: Verbalized understanding    PATIENT GOALS: \" I [place] M[place] go home. You guys make me want to kill myself. \"     PLAN/TREATMENT RECOMMENDATIONS UPDATE: maintain safety, medication management     GOALS UPDATE:   Time frame for Short-Term Goals: by time of macie Muñoz RN
C/O L chest pain, sharp. States that this is her usual chest pain, but that it is happening more often. Lab here, Ekg done.  Pulse ox 89%
Comments: - Interactions, - Contribution, and - engagement    Time: 1528-9116    Type of group: Wrap up group     Level of participation: Did not attend
Group Therapy Note    Date: 12/11/2018  Start Time: 20:00  End Time:  21:00  Number of Participants: 9    Type of Group: Recreational  Wrap up    Torrey Kulkarni Information  Module Name:  /  Session Number:  /    Patient's Goal:  Be more positive    Notes:  Helped others goal completed    Status After Intervention:  Improved    Participation Level:  Active Listener and Interactive    Participation Quality: Appropriate, Attentive and Sharing      Speech:  pressured      Thought Process/Content: Logical      Affective Functioning: Blunted      Mood: anxious and depressed      Level of consciousness:  Alert, Oriented x4 and Attentive      Response to Learning: Progressing to goal      Endings: None Reported    Modes of Intervention: Socialization and Problem-solving      Discipline Responsible: [address]      Signature:  Ghislaine Burns
Group Therapy Note    Date: 12/14/2018  Start Time: 10:00 AM  End Time:  10:50 AM  Number of Participants: 10    Type of Group: Psychoeducation    Recreation Therapy    Patient's Goal: Writer encouraged Pt to identify and create a collage of positive activities and positive ways to be involved in the community to decrease depression symptoms or loneliness. Notes: Pt was late to group due to being in a meeting with a staff member. Pt actively participated in group discussion and group activity. Pt shared with peers about her past [place] memories and her cat. Pt completed collage with minimal assistance from staff and peer. Status After Intervention:  Improved    Participation Level:  Active Listener and Interactive    Participation Quality: Appropriate, Attentive and Sharing      Speech:  normal      Thought Process/Content: Linear      Affective Functioning: Congruent      Mood: euthymic      Level of consciousness:  Alert, Oriented x4 and Attentive      Response to Learning: Able to verbalize/acknowledge new learning, Able to retain information and Capable of insight      Endings: None Reported    Modes of Intervention: Education, Support, Socialization, Problem-solving and Activity      Discipline Responsible: Psychoeducational Specialist      Signature:  Fito Monsalve, [address]
[place] sent to CINTHIA TONEY regarding patient's right eye being puffy, red and itching. Patient c/o itching and watering. Will monitor pending evaluation from PA. T 98.5 orally. No current drainage noted, eye does look watery/glassy.
Patient states she has not ate since Friday. Patient is refusing to eat or drink anything on the unit. Patient keeps stating that \"why cant I make the decision to die? I'm almost 68years old. I just want to die. \"    Patient also stated to RN \"I should have never called the .  I should have just taken the pills I have at home\"
While rounding, author observed pt sitting on side of bed, author suggested pt get all the way into bed so she can take a nap, pt said \"no\", and as author began to continue rounding, pt observed gently and purposefully, in a controlled and multi-step manner, lowering herself to the floor while moaning loudly. Author began assessing pt on floor, and pt was refusing to answer, though was observed looking around before tightly closing her eyes, all whilst refusing to answer to verbal cues. Author used ammonia salts to ensure pt was not unresponsive: upon utilizing the salts, pt immediately and completely \"awoke\" and began cussing at [employer]. Pt showed no alteration in mental status, remains alert and oriented, agitated, oppositional, irritable. Vitals obtained:  /94, P 83, R 18    Pt still verbally refusing to get up off of the floor, even with assistance, as was offered by two nursing staff. Psych provider Rupert Joseph NP notified and present. Author provided a pillow for comfort, will continue to monitor and offer assistance, and will provide psychical assistance to pt when she is ready/consents to the help.      -update: after ~5 mins pt got herself up, and into bed, did not require physical assistance from staff and would not allow staff to help. Charge nurse Mary HUERTA also notified.
Writer invited and encouraged Pt to attend group. Pt did not attend group.     Kwaku Doe, CARMEL, Luciana Kellogg
( )  Coping skills (new ways to manage stress, exercise, relaxation techniques, changing routine, distraction), if not completed on admission                                                           ( )  Basic information about quitting (benefits of quitting, techniques in how to quit, available resources, if not completed on admission  ( ) Referral for counseling faxed to [facility]   ( ) Patient refused referral  ( ) Patient refused counseling  ( ) Patient refused smoking cessation medication upon discharge    Vaccinations (orly X if applicable and completed):   (x ) Patient states already received influenza vaccine elsewhere  ( ) Patient received influenza vaccine during this hospitalization  ( ) Patient refused influenza vaccine at this time

## 2019-03-30 ENCOUNTER — HOSPITAL ENCOUNTER (INPATIENT)
Age: 69
LOS: 2 days | Discharge: PSYCHIATRIC OR SUBSTANCE USE FACILITY | DRG: 918 | End: 2019-04-01
Attending: EMERGENCY MEDICINE | Admitting: INTERNAL MEDICINE
Payer: MEDICARE

## 2019-03-30 DIAGNOSIS — T50.902A OD (OVERDOSE OF DRUG), INTENTIONAL SELF-HARM, INITIAL ENCOUNTER (HCC): Primary | ICD-10-CM

## 2019-03-30 DIAGNOSIS — F19.10 POLYSUBSTANCE ABUSE (HCC): ICD-10-CM

## 2019-03-30 DIAGNOSIS — R45.851 SUICIDAL IDEATION: ICD-10-CM

## 2019-03-30 LAB
A/G RATIO: 1.4 (ref 1.1–2.2)
ACETAMINOPHEN LEVEL: <5 UG/ML (ref 10–30)
ACETAMINOPHEN LEVEL: <5 UG/ML (ref 10–30)
ALBUMIN SERPL-MCNC: 3.6 G/DL (ref 3.4–5)
ALP BLD-CCNC: 192 U/L (ref 40–129)
ALT SERPL-CCNC: 7 U/L (ref 10–40)
AMPHETAMINE SCREEN, URINE: POSITIVE
ANION GAP SERPL CALCULATED.3IONS-SCNC: 13 MMOL/L (ref 3–16)
ANION GAP SERPL CALCULATED.3IONS-SCNC: 13 MMOL/L (ref 3–16)
AST SERPL-CCNC: 11 U/L (ref 15–37)
BACTERIA: ABNORMAL /HPF
BARBITURATE SCREEN URINE: ABNORMAL
BASOPHILS ABSOLUTE: 0 K/UL (ref 0–0.2)
BASOPHILS RELATIVE PERCENT: 0 %
BENZODIAZEPINE SCREEN, URINE: ABNORMAL
BILIRUB SERPL-MCNC: 0.4 MG/DL (ref 0–1)
BILIRUBIN URINE: NEGATIVE
BLOOD, URINE: ABNORMAL
BUN BLDV-MCNC: 21 MG/DL (ref 7–20)
BUN BLDV-MCNC: 24 MG/DL (ref 7–20)
CALCIUM SERPL-MCNC: 8.3 MG/DL (ref 8.3–10.6)
CALCIUM SERPL-MCNC: 9.1 MG/DL (ref 8.3–10.6)
CANNABINOID SCREEN URINE: ABNORMAL
CASTS: ABNORMAL /LPF
CHLORIDE BLD-SCNC: 109 MMOL/L (ref 99–110)
CHLORIDE BLD-SCNC: 112 MMOL/L (ref 99–110)
CLARITY: CLEAR
CO2: 19 MMOL/L (ref 21–32)
CO2: 20 MMOL/L (ref 21–32)
COCAINE METABOLITE SCREEN URINE: ABNORMAL
COLOR: YELLOW
CREAT SERPL-MCNC: 1 MG/DL (ref 0.6–1.2)
CREAT SERPL-MCNC: 1.2 MG/DL (ref 0.6–1.2)
EKG ATRIAL RATE: 81 BPM
EKG DIAGNOSIS: NORMAL
EKG P AXIS: 44 DEGREES
EKG P-R INTERVAL: 174 MS
EKG Q-T INTERVAL: 438 MS
EKG QRS DURATION: 88 MS
EKG QTC CALCULATION (BAZETT): 508 MS
EKG R AXIS: 5 DEGREES
EKG T AXIS: 22 DEGREES
EKG VENTRICULAR RATE: 81 BPM
EOSINOPHILS ABSOLUTE: 0.1 K/UL (ref 0–0.6)
EOSINOPHILS RELATIVE PERCENT: 1 %
EPITHELIAL CELLS, UA: ABNORMAL /HPF
ETHANOL: NORMAL MG/DL (ref 0–0.08)
GFR AFRICAN AMERICAN: 54
GFR AFRICAN AMERICAN: >60
GFR NON-AFRICAN AMERICAN: 44
GFR NON-AFRICAN AMERICAN: 55
GLOBULIN: 2.6 G/DL
GLUCOSE BLD-MCNC: 143 MG/DL (ref 70–99)
GLUCOSE BLD-MCNC: 157 MG/DL (ref 70–99)
GLUCOSE URINE: NEGATIVE MG/DL
HCT VFR BLD CALC: 31.4 % (ref 36–48)
HEMOGLOBIN: 10.3 G/DL (ref 12–16)
KETONES, URINE: NEGATIVE MG/DL
LEUKOCYTE ESTERASE, URINE: ABNORMAL
LYMPHOCYTES ABSOLUTE: 1.9 K/UL (ref 1–5.1)
LYMPHOCYTES RELATIVE PERCENT: 24 %
Lab: ABNORMAL
MAGNESIUM: 2 MG/DL (ref 1.8–2.4)
MCH RBC QN AUTO: 30.2 PG (ref 26–34)
MCHC RBC AUTO-ENTMCNC: 32.8 G/DL (ref 31–36)
MCV RBC AUTO: 92 FL (ref 80–100)
METHADONE SCREEN, URINE: ABNORMAL
MICROSCOPIC EXAMINATION: YES
MONOCYTES ABSOLUTE: 0.4 K/UL (ref 0–1.3)
MONOCYTES RELATIVE PERCENT: 5 %
NEUTROPHILS ABSOLUTE: 5.5 K/UL (ref 1.7–7.7)
NEUTROPHILS RELATIVE PERCENT: 70 %
NITRITE, URINE: NEGATIVE
OPIATE SCREEN URINE: ABNORMAL
OVALOCYTES: ABNORMAL
OXYCODONE URINE: ABNORMAL
PDW BLD-RTO: 14.2 % (ref 12.4–15.4)
PH UA: 5
PH UA: 5 (ref 5–8)
PHENCYCLIDINE SCREEN URINE: ABNORMAL
PHOSPHORUS: 3.6 MG/DL (ref 2.5–4.9)
PLATELET # BLD: 147 K/UL (ref 135–450)
PMV BLD AUTO: 8.5 FL (ref 5–10.5)
POTASSIUM REFLEX MAGNESIUM: 3.2 MMOL/L (ref 3.5–5.1)
POTASSIUM SERPL-SCNC: 3.7 MMOL/L (ref 3.5–5.1)
PROPOXYPHENE SCREEN: ABNORMAL
PROTEIN UA: NEGATIVE MG/DL
RBC # BLD: 3.41 M/UL (ref 4–5.2)
RBC UA: ABNORMAL /HPF (ref 0–2)
SALICYLATE, SERUM: <0.3 MG/DL (ref 15–30)
SLIDE REVIEW: ABNORMAL
SODIUM BLD-SCNC: 142 MMOL/L (ref 136–145)
SODIUM BLD-SCNC: 144 MMOL/L (ref 136–145)
SPECIFIC GRAVITY UA: 1.01 (ref 1–1.03)
TOTAL PROTEIN: 6.2 G/DL (ref 6.4–8.2)
TROPONIN: <0.01 NG/ML
URINE REFLEX TO CULTURE: YES
URINE TYPE: ABNORMAL
UROBILINOGEN, URINE: 0.2 E.U./DL
WBC # BLD: 7.9 K/UL (ref 4–11)
WBC UA: ABNORMAL /HPF (ref 0–5)

## 2019-03-30 PROCEDURE — 80307 DRUG TEST PRSMV CHEM ANLYZR: CPT

## 2019-03-30 PROCEDURE — 87086 URINE CULTURE/COLONY COUNT: CPT

## 2019-03-30 PROCEDURE — 80053 COMPREHEN METABOLIC PANEL: CPT

## 2019-03-30 PROCEDURE — 2580000003 HC RX 258: Performed by: INTERNAL MEDICINE

## 2019-03-30 PROCEDURE — G0480 DRUG TEST DEF 1-7 CLASSES: HCPCS

## 2019-03-30 PROCEDURE — 2000000000 HC ICU R&B

## 2019-03-30 PROCEDURE — 36415 COLL VENOUS BLD VENIPUNCTURE: CPT

## 2019-03-30 PROCEDURE — 81001 URINALYSIS AUTO W/SCOPE: CPT

## 2019-03-30 PROCEDURE — 2580000003 HC RX 258: Performed by: EMERGENCY MEDICINE

## 2019-03-30 PROCEDURE — 99291 CRITICAL CARE FIRST HOUR: CPT

## 2019-03-30 PROCEDURE — 94762 N-INVAS EAR/PLS OXIMTRY CONT: CPT

## 2019-03-30 PROCEDURE — 84100 ASSAY OF PHOSPHORUS: CPT

## 2019-03-30 PROCEDURE — 96361 HYDRATE IV INFUSION ADD-ON: CPT

## 2019-03-30 PROCEDURE — 83735 ASSAY OF MAGNESIUM: CPT

## 2019-03-30 PROCEDURE — 6360000002 HC RX W HCPCS: Performed by: PHYSICIAN ASSISTANT

## 2019-03-30 PROCEDURE — 96365 THER/PROPH/DIAG IV INF INIT: CPT

## 2019-03-30 PROCEDURE — 84484 ASSAY OF TROPONIN QUANT: CPT

## 2019-03-30 PROCEDURE — 85025 COMPLETE CBC W/AUTO DIFF WBC: CPT

## 2019-03-30 PROCEDURE — 93005 ELECTROCARDIOGRAM TRACING: CPT | Performed by: INTERNAL MEDICINE

## 2019-03-30 PROCEDURE — 2580000003 HC RX 258: Performed by: PHYSICIAN ASSISTANT

## 2019-03-30 RX ORDER — ROPINIROLE 0.25 MG/1
0.25 TABLET, FILM COATED ORAL 3 TIMES DAILY
Status: ON HOLD | COMMUNITY
End: 2019-04-01 | Stop reason: HOSPADM

## 2019-03-30 RX ORDER — POTASSIUM CHLORIDE 7.45 MG/ML
10 INJECTION INTRAVENOUS
Status: DISCONTINUED | OUTPATIENT
Start: 2019-03-30 | End: 2019-03-30

## 2019-03-30 RX ORDER — SODIUM CHLORIDE 0.9 % (FLUSH) 0.9 %
10 SYRINGE (ML) INJECTION EVERY 12 HOURS SCHEDULED
Status: DISCONTINUED | OUTPATIENT
Start: 2019-03-30 | End: 2019-04-01 | Stop reason: HOSPADM

## 2019-03-30 RX ORDER — SODIUM CHLORIDE 9 MG/ML
INJECTION, SOLUTION INTRAVENOUS CONTINUOUS
Status: DISCONTINUED | OUTPATIENT
Start: 2019-03-30 | End: 2019-03-31

## 2019-03-30 RX ORDER — ONDANSETRON 2 MG/ML
4 INJECTION INTRAMUSCULAR; INTRAVENOUS EVERY 6 HOURS PRN
Status: DISCONTINUED | OUTPATIENT
Start: 2019-03-30 | End: 2019-03-30

## 2019-03-30 RX ORDER — LEVOCETIRIZINE DIHYDROCHLORIDE 5 MG/1
5 TABLET, FILM COATED ORAL NIGHTLY
Status: ON HOLD | COMMUNITY
End: 2019-04-01 | Stop reason: HOSPADM

## 2019-03-30 RX ORDER — POTASSIUM CHLORIDE 7.45 MG/ML
10 INJECTION INTRAVENOUS PRN
Status: DISCONTINUED | OUTPATIENT
Start: 2019-03-31 | End: 2019-04-01 | Stop reason: HOSPADM

## 2019-03-30 RX ORDER — TRAZODONE HYDROCHLORIDE 100 MG/1
100 TABLET ORAL NIGHTLY
Status: ON HOLD | COMMUNITY
End: 2019-04-01 | Stop reason: HOSPADM

## 2019-03-30 RX ORDER — SODIUM CHLORIDE 0.9 % (FLUSH) 0.9 %
10 SYRINGE (ML) INJECTION PRN
Status: DISCONTINUED | OUTPATIENT
Start: 2019-03-30 | End: 2019-04-01 | Stop reason: HOSPADM

## 2019-03-30 RX ORDER — POTASSIUM CHLORIDE 7.45 MG/ML
10 INJECTION INTRAVENOUS ONCE
Status: COMPLETED | OUTPATIENT
Start: 2019-03-30 | End: 2019-03-30

## 2019-03-30 RX ORDER — METHOCARBAMOL 500 MG/1
500 TABLET, FILM COATED ORAL 4 TIMES DAILY
Status: ON HOLD | COMMUNITY
End: 2019-04-01 | Stop reason: HOSPADM

## 2019-03-30 RX ORDER — DULOXETIN HYDROCHLORIDE 60 MG/1
60 CAPSULE, DELAYED RELEASE ORAL DAILY
Status: ON HOLD | COMMUNITY
End: 2019-04-01 | Stop reason: HOSPADM

## 2019-03-30 RX ORDER — 0.9 % SODIUM CHLORIDE 0.9 %
1000 INTRAVENOUS SOLUTION INTRAVENOUS ONCE
Status: COMPLETED | OUTPATIENT
Start: 2019-03-30 | End: 2019-03-30

## 2019-03-30 RX ORDER — MAGNESIUM SULFATE 1 G/100ML
1 INJECTION INTRAVENOUS PRN
Status: DISCONTINUED | OUTPATIENT
Start: 2019-03-30 | End: 2019-04-01 | Stop reason: HOSPADM

## 2019-03-30 RX ORDER — LOVASTATIN 40 MG/1
40 TABLET ORAL NIGHTLY
Status: ON HOLD | COMMUNITY
End: 2019-04-01 | Stop reason: HOSPADM

## 2019-03-30 RX ORDER — GABAPENTIN 600 MG/1
600 TABLET ORAL 3 TIMES DAILY
Status: ON HOLD | COMMUNITY
End: 2019-04-01 | Stop reason: HOSPADM

## 2019-03-30 RX ADMIN — SODIUM CHLORIDE: 9 INJECTION, SOLUTION INTRAVENOUS at 20:26

## 2019-03-30 RX ADMIN — POTASSIUM CHLORIDE 10 MEQ: 7.46 INJECTION, SOLUTION INTRAVENOUS at 15:19

## 2019-03-30 RX ADMIN — SODIUM CHLORIDE 1000 ML: 9 INJECTION, SOLUTION INTRAVENOUS at 14:57

## 2019-03-30 RX ADMIN — SODIUM CHLORIDE 1000 ML: 9 INJECTION, SOLUTION INTRAVENOUS at 14:27

## 2019-03-30 RX ADMIN — Medication 10 ML: at 19:41

## 2019-03-30 ASSESSMENT — PAIN SCALES - GENERAL: PAINLEVEL_OUTOF10: 0

## 2019-03-30 ASSESSMENT — ENCOUNTER SYMPTOMS
GASTROINTESTINAL NEGATIVE: 1
RESPIRATORY NEGATIVE: 1

## 2019-03-30 NOTE — PROGRESS NOTES
Admission assessment and questions was completed (see flow sheet). Pt is A/O, denies any pain- other than chronic pain in shoulder. Pt denies other needs at this time. Respirations are even, unlabored, with clear sounds. Scheduled medications to follow-. Sitter at beside. Pt continues with suicidal ideations. Call light within reach. Bed in lowest position. Bed alarm on. Will continue to monitor.

## 2019-03-30 NOTE — PROGRESS NOTES
Bedside report received from [facility] in ED. Pt admitted with OD. Pt transported via stretcher to ICU room 12. Admission questions to follow. Pt oriented to room, staff and call light. RN sitter at bedside. Continuing to monitor.

## 2019-03-30 NOTE — ED PROVIDER NOTES
I independently examined and evaluated Yenni Wilson. In brief, patient here today with an intentional drug overdose because of suicidal thoughts and attempted suicide. The patient's been under some stress she had spoke to her daughter and got to an argument. The patient lives in independent living and she has a pillbox that has her weekly medicines but apparently she took all 6 days worth of her medications which include multiple medications. I did discuss all this with the poison control center and because of this we need to observe the patient and the hospital.  She was hypotensive when she came in but her she responded to fluids up to the low 100s. She has good peripheral pulses she's alert answering questions. This happened around noon and so she's not candidate for charcoal.  I did discuss the case with the intensivist they will watch the patient closely in the ICU. Her potassium is mildly elevated she does look a little dehydrated so we will give her fluids continue to treat her symptomatically. QTC is a little bit prolonged so we'll continue to watch at as well but she is at risk for dysrhythmias, seizures because of the overdose. She was involuntarily committed by the police before arrival..  Patient is otherwise cooperative alert here. Resting calmly. Focused exam revealed alert nontoxic pupils are equal round reactive to light appear to be normal.  She has good strong peripheral pulses her heart is regular no murmurs lungs are clear breathing comfortably not complaining of any pain on palpation of her abdomen. Normal strength and sensation was she sitting in the bed. ED course: As above. EKG-interpreted by me normal sinus rhythm rate of 81. There is mild finding the T waves which is nonspecific. No ST elevation or depression QTc is mildly prolonged at 508 ms. QRS is normal.  No comparison immediately available.     Critical care note-due to frequent cardiac vascular monitoring management of a intentional drug overdose with some transient hypotension with fluids and monitoring I did provide 45 minutes of critical care this patient excluding any procedures or family discussion    All diagnostic, treatment, and disposition decisions were made by myself in conjunction with the advanced practice provider. For all further details of the patient's emergency department visit, please see the advanced practice provider's documentation. Comment: Please note this report has been produced using speech recognition software and may contain errors related to that system including errors in grammar, punctuation, and spelling, as well as words and phrases that may be inappropriate. If there are any questions or concerns please feel free to contact the dictating provider for clarification.         Jovi Foster,   03/30/19 1534

## 2019-03-30 NOTE — ED NOTES
Chief Complaint   Patient presents with    Drug Overdose     Betsey took 6 days of her medication at 1200 today after getting into an arguement with her daughter today. States she wanted to kill herself. Patient belonging removed and patient placed in suicidal gown. IV inserted and blood sent to lab. NS bolus infusing, BP 82/27 at this time. Physician and PA at bedside on arrival. Physician calling poison control.       Asael Meek RN  03/30/19 [id number]

## 2019-03-30 NOTE — ED NOTES
[phone] - [facility] serve sent Dr. Carlie Iraheta  03/30/19 1510    1511 - Called critical care (Pulmonary) on call. Dr. Ismael Rios  03/30/19 1513    5 - Dr. Deny Palma called back. Dave Ricci  03/30/19 [facility] called back.      Dave Ricci  03/30/19 1533

## 2019-03-30 NOTE — PLAN OF CARE
17-year-old female here with multidrug overdose, suicidal intent  Lives in independent living.   On multiple medications at home, and likely took around 6 days worth of her medications  -Admitted to ICU  -Was hypotensive in ER improved with IV fluids  -Monitor QTC  Potassium repleted

## 2019-03-30 NOTE — ED PROVIDER NOTES
[address] ED  eMERGENCY dEPARTMENT eNCOUnter        Pt Name: Anibal Velsaco  MRN: [id number]  [place] 1950  Date of evaluation: 3/30/2019  Provider: Olive Wallace PA-C  PCP: KELI Briggs - CNP    This patient was seen and evaluated by the attending physician Dr. Eben Weldon. CHIEF COMPLAINT       Chief Complaint   Patient presents with    Drug Overdose     Paitent took 6 days of her medication at 1200 today after getting into an arguement with her daughter today. States she wanted to kill herself. HISTORY OF PRESENT ILLNESS   (Location/Symptom, Timing/Onset, Context/Setting, Quality, Duration, Modifying Factors, Severity)  Note limiting factors. Anibal Velasco is a 68 y.o. female brought in by squad for complaints of an overdose. Patient states she took 6 days of her medication after getting in an argument with her daughter today. Patient does admit to suicidal ideation. Patient states she took all of her medications. Patient is on several medications. Patient states she took them at about noon. Patient denies any complaints. Patient states she took them wanting to die. No aggravating or alleviating symptoms. Nursing Notes were all reviewed and agreed with or any disagreements were addressed  in the HPI. REVIEW OF SYSTEMS    (2-9 systems for level 4, 10 or more for level 5)     Review of Systems   Constitutional: Negative. HENT: Negative. Respiratory: Negative. Cardiovascular: Negative. Gastrointestinal: Negative. Genitourinary: Negative. Musculoskeletal: Negative. Skin: Negative. Neurological: Negative. Psychiatric/Behavioral: Positive for suicidal ideas. Positives and Pertinent negatives as per HPI. Except as noted abovein the ROS, all other systems were reviewed and negative.        PAST MEDICAL HISTORY     Past Medical History:   Diagnosis Date    Anxiety     Asthma     Bipolar 1 disorder ([facility] [address]     antitoxin; Thimerosal; Demerol hcl [meperidine]; Amoxicillin-pot clavulanate; Tetanus toxoid, adsorbed; Adhesive tape; Iodides; Iodine; Iodine i 131 albumin; Morphine and related; Morpholine salicylate; and Tetanus toxoids    FAMILYHISTORY       Family History   Problem Relation Age of Onset    High Blood Pressure Mother     High Blood Pressure Father     Cancer Sister           SOCIAL HISTORY       Social History     Socioeconomic History    Marital status:       Spouse name: None    Number of children: None    Years of education: None    Highest education level: None   Occupational History    None   Social Needs    Financial resource strain: None    Food insecurity:     Worry: None     Inability: None    Transportation needs:     Medical: None     Non-medical: None   Tobacco Use    Smoking status: Never Smoker    Smokeless tobacco: Never Used   Substance and Sexual Activity    Alcohol use: No    Drug use: No    Sexual activity: None   Lifestyle    Physical activity:     Days per week: None     Minutes per session: None    Stress: None   Relationships    Social connections:     Talks on phone: None     Gets together: None     Attends Synagogue service: None     Active member of club or organization: None     Attends meetings of clubs or organizations: None     Relationship status: None    Intimate partner violence:     Fear of current or ex partner: None     Emotionally abused: None     Physically abused: None     Forced sexual activity: None   Other Topics Concern    None   Social History Narrative    None       SCREENINGS    Rhianna Coma Scale  Eye Opening: Spontaneous  Best Verbal Response: Oriented  Best Motor Response: Obeys commands  [place] Coma Scale Score: 15        PHYSICAL EXAM    (up to 7 for level 4, 8 or more for level 5)     ED Triage Vitals   BP Temp Temp src Pulse Resp SpO2 Height Weight   -- -- -- -- -- -- -- --       Physical Exam   Constitutional: She is oriented to person, place, and time. She appears well-developed and well-nourished. Nasal cannula in place. HENT:   Head: Normocephalic and atraumatic. Nose: Nose normal.   Mouth/Throat: Oropharynx is clear and moist. Mucous membranes are dry. Eyes: Pupils are equal, round, and reactive to light. Conjunctivae and EOM are normal. Right eye exhibits no discharge. Left eye exhibits no discharge. Right eye exhibits normal extraocular motion and no nystagmus. Left eye exhibits normal extraocular motion and no nystagmus. Right pupil is round and reactive. Left pupil is round and reactive. Pupils are equal.   Neck: Normal range of motion. Neck supple. Cardiovascular: Normal rate, regular rhythm and normal heart sounds. Exam reveals no gallop. No murmur heard. Pulses:       Radial pulses are 2+ on the right side, and 2+ on the left side. Popliteal pulses are 2+ on the right side, and 2+ on the left side. Pulmonary/Chest: Effort normal and breath sounds normal. No respiratory distress. She has no wheezes. She has no rales. She exhibits no tenderness. Musculoskeletal: Normal range of motion. She exhibits no deformity. Neurological: She is alert and oriented to person, place, and time. Skin: Skin is warm and dry. She is not diaphoretic. Psychiatric: She has a normal mood and affect. Her behavior is normal.   Nursing note and vitals reviewed.       DIAGNOSTIC RESULTS   LABS:    Labs Reviewed   CBC WITH AUTO DIFFERENTIAL - Abnormal; Notable for the following components:       Result Value    RBC 3.41 (*)     Hemoglobin 10.3 (*)     Hematocrit 31.4 (*)     Ovalocytes Occasional (*)     All other components within normal limits    Narrative:     Performed at:  [facility],  [place], [facility]   Phone [phone]   COMPREHENSIVE METABOLIC PANEL W/ REFLEX TO MG FOR LOW K - Abnormal; Notable for the following components:    Potassium reflex Magnesium 3.2 (*) CO2 20 (*)     Glucose 157 (*)     BUN 24 (*)     GFR Non- 44 (*)     GFR  54 (*)     Total Protein 6.2 (*)     Alkaline Phosphatase 192 (*)     ALT 7 (*)     AST 11 (*)     All other components within normal limits    Narrative:     Performed at:  [facility],  [employer]   Phone [phone]   SALICYLATE LEVEL - Abnormal; Notable for the following components:    Salicylate, Serum <3.8 (*)     All other components within normal limits    Narrative:     Performed at:  [facility] [employer],  [employer]   Phone [phone]   ACETAMINOPHEN LEVEL - Abnormal; Notable for the following components:    Acetaminophen Level <5 (*)     All other components within normal limits    Narrative:     Performed at:  [facility] [employer],  [employer]   Phone [phone]   URINE RT REFLEX TO CULTURE - Abnormal; Notable for the following components:    Blood, Urine TRACE-INTACT (*)     Leukocyte Esterase, Urine LARGE (*)     All other components within normal limits    Narrative:     Performed at:  [facility]  [employer]   Phone [phone]   [address] - Abnormal; Notable for the following components:    Amphetamine Screen, Urine POSITIVE (*)     All other components within normal limits    Narrative:     Performed at:  [facility] [employer],  [employer]   Phone [phone]   MICROSCOPIC URINALYSIS - Abnormal; Notable for the following components:    Casts 0-1 Hyaline (*)     WBC, UA 20-50 (*)     RBC, UA 3-5 (*)     Bacteria, UA 2+ (*)     All other components within normal limits    Narrative:     Performed at:  [facility]) [facility] [address],  [employer]   Phone [phone] mEq/100 mL IVPB (Peripheral Line) (10 mEq Intravenous New Bag 3/30/19 0805)       Patient brought in today for an overdose. Patient states she took 6 days worth of her at-home medications. Patient took her medications at about noon today. She took them wanting to die. Patient states she was in an argument with her daughter earlier today. On exam patient is alert oriented afebrile hemodynamically stable breathing on 2 L of nasal cannula satting at 97%. Patient answers questions appropriately. She appears nontoxic and in no respiratory distress. Patient has strong pulses bilaterally to the radial and popliteal.  Lungs are clear. Patient is regular rate and rhythm. Abdomen soft and nontender. Plan will be to call poison control. We did call poison control who advised symptomatic care at this time and observation. Plan will be to admit. Patient given bolus of fluids as she was hypotensive. Urine Drug screen positive for amphetamines. Potassium 3.2 plan will to supplement here in the ER Urine positive for leukocytes with + 20-50 WBC. Plan will be to culture the urine as she is having no urinary complaints. Plan will be to admit at this time for polypharmacy overdose. Patient given another bolus of fluids. Patient's blood pressure is responding appropriately. I spoke to pulmonology Dr. Reyna Staton who was aware and agreed to admit to ICU. I also spoke to hospitalist Dr. Tee Gutierrez who agreed to admit as well. Patient was comfortable prior to admission. FINAL IMPRESSION      1. OD (overdose of drug), intentional self-harm, initial encounter ([facility] [address]    2. Polysubstance abuse ([facility] [address]    3.  Suicidal ideation          DISPOSITION/PLAN   DISPOSITION        PATIENT REFERREDTO:  Twin Aparicio, APRN - [facility]  [phone]            DISCHARGE MEDICATIONS:  New Prescriptions    No medications on file       DISCONTINUED MEDICATIONS:  Discontinued Medications    ATORVASTATIN (LIPITOR) 80 MG TABLET    Take 1 tablet by mouth nightly    DONEPEZIL (ARICEPT) 10 MG TABLET    Take 1 tablet by mouth nightly    ESCITALOPRAM (LEXAPRO) 5 MG TABLET    Take 1 tablet by mouth daily    FAMOTIDINE (PEPCID) 20 MG TABLET    Take 1 tablet by mouth 2 times daily    MIRTAZAPINE (REMERON) 15 MG TABLET    Take 1 tablet by mouth nightly    PANTOPRAZOLE (PROTONIX) 40 MG TABLET    Take 1 tablet by mouth 2 times daily    RANOLAZINE (RANEXA) 500 MG EXTENDED RELEASE TABLET    Take 500 mg by mouth 2 times daily    TOPIRAMATE (TOPAMAX) 25 MG TABLET    Take 4 tablets by mouth 2 times daily    VITAMIN D (CHOLECALCIFEROL) 1000 UNIT TABS TABLET    Take 1 tablet by mouth daily              (Please note that portions ofthis note were completed with a voice recognition program.  Efforts were made to edit the dictations but occasionally words are mis-transcribed.)    Jenny Rossi PA-C (electronically signed)            Jenny Rossi PA-C  03/30/19 3827

## 2019-03-30 NOTE — ED NOTES
Vitals:    03/30/19 1617   BP: (!) 96/56   Pulse: 81   Resp: 13   Temp:    SpO2: 98%       Vitals stable before exiting ER. Gave report to [employer], RN. Patient exited ER in stretcher in stable condition with belongings.       Elvia Husain RN  03/30/19 0240

## 2019-03-30 NOTE — H&P
Hospital Medicine History & Physical      PCP: Nasramessi Mathew, APRN - CNP    Date of Service: Pt seen/examined on 3/30/19 and admitted on 3/30/19 to Inpatient    Chief Complaint   Patient presents with    Drug Overdose     Betsey took 6 days of her medication at 1200 today after getting into an arguement with her daughter today. States she wanted to kill herself. History Of Present Illness: The patient is a 68 y.o. female with PMH below, presents with intentional overdose, suicidal ideation. Pt reports that around noon she intentionally took 6 days worth of her home Rx following an argument with her daughter. Her pills are normally in a 6 day pill box. She reported took all of the remaining pills for 6 days. Per her Med Rec she is on 20 different Rx. She lives in assisted living. She admits to [facility] but denies HI/HP. She was hypotensive upon arrival in the ED but this did improve in the ED. She was placed on a hold by police. Admitted to ICU for close monitoring. She reports that she has been increasingly depressed and has hx of bipolar and depression. She reports that she has not seen a psychiatrist in many years. Past Medical History:        Diagnosis Date    Anxiety     Asthma     Bipolar 1 disorder ([facility] [address]     CAD (coronary artery disease)     Depression     GERD (gastroesophageal reflux disease)     Suicidal behavior     hx: 5 years ago and 4 other times 10 years ago    Thyroid disease        Past Surgical History:        Procedure Laterality Date    BACK SURGERY      CHOLECYSTECTOMY      JOINT REPLACEMENT      SHOULDER ARTHROPLASTY Left        Medications Prior to Admission:    Prior to Admission medications    Medication Sig Start Date End Date Taking? Authorizing Provider   DULoxetine (CYMBALTA) 60 MG extended release capsule Take 60 mg by mouth daily   Yes Historical Provider, MD   gabapentin (NEURONTIN) 600 MG tablet Take 600 mg by mouth 3 times daily.    Yes Historical Provider, MD   levocetirizine (XYZAL) 5 MG tablet Take 5 mg by mouth nightly   Yes Historical Provider, MD   lovastatin (MEVACOR) 40 MG tablet Take 40 mg by mouth nightly   Yes Historical Provider, MD   methocarbamol (ROBAXIN) 500 MG tablet Take 500 mg by mouth 4 times daily   Yes Historical Provider, MD   metoprolol tartrate (LOPRESSOR) 25 MG tablet Take 25 mg by mouth 2 times daily   Yes Historical Provider, MD   rOPINIRole (REQUIP) 0.25 MG tablet Take 0.25 mg by mouth 3 times daily   Yes Historical Provider, MD   traZODone (DESYREL) 100 MG tablet Take 100 mg by mouth nightly   Yes Historical Provider, MD   ranitidine (ZANTAC) 150 MG tablet Take 1 tablet by mouth 2 times daily 12/17/18  Yes Diaz Mckeon MD   aspirin 81 MG chewable tablet Take 81 mg by mouth daily   Yes Historical Provider, MD   donepezil (ARICEPT) 10 MG tablet Take 10 mg by mouth nightly   Yes Historical Provider, MD   lisinopril (PRINIVIL;ZESTRIL) 40 MG tablet Take 40 mg by mouth daily   Yes Historical Provider, MD   montelukast (SINGULAIR) 10 MG tablet Take 10 mg by mouth nightly   Yes Historical Provider, MD   topiramate (TOPAMAX) 100 MG tablet Take 100 mg by mouth 2 times daily   Yes Historical Provider, MD   Cyanocobalamin (B-12) 1000 MCG TABS Take 1,000 mcg by mouth daily 6/1/16  Yes Historical Provider, MD   hydrOXYzine (ATARAX) 25 MG tablet Take 25 mg by mouth 3 times daily as needed   Yes Historical Provider, MD   atorvastatin (LIPITOR) 80 MG tablet Take 80 mg by mouth nightly 10/1/18  Yes Historical Provider, MD   clopidogrel (PLAVIX) 75 MG tablet Take 75 mg by mouth daily 7/30/18  Yes Historical Provider, MD   furosemide (LASIX) 20 MG tablet Take 20 mg by mouth nightly  7/30/18  Yes Historical Provider, MD   pantoprazole (PROTONIX) 40 MG tablet Take 40 mg by mouth 2 times daily   Yes Historical Provider, MD       Allergies:  Diphth-acell pertussis-tetanus; Lithium; Penicillins; Tetanus antitoxin;  Thimerosal; Demerol hcl QRS Duration 84 ms    Q-T Interval 376 ms    QTc Calculation (Bazett) 441 ms    P Axis 51 degrees    R Axis 29 degrees    T Axis 52 degrees    Diagnosis Normal sinus rhythmNonspecific T wave abnormalityAbnormal ECGWhen compared with ECG of 30-MAR-2019 13:59, (unconfirmed)QT has shortened         CBC:  Recent Labs     03/30/19  1406   WBC 7.9   HGB 10.3*   HCT 31.4*         RENAL  Recent Labs     03/30/19  1406      K 3.2*      CO2 20*   BUN 24*   CREATININE 1.2   GLUCOSE 157*     LFT'S:  Recent Labs     03/30/19  1406   AST 11*   ALT 7*   BILITOT 0.4   ALKPHOS 192*     CARDIAC ENZYMES:   Lab Results   Component Value Date    PROBNP 176 (H) 04/19/2018     U/A:  Recent Labs     03/30/19  1321 03/30/19  1421   LEUKOCYTESUR  --  LARGE*   BACTERIA 2+*  --    WBCUA 20-50*  --    COLORU  --  Yellow   RBCUA 3-5*  --    CLARITYU  --  Clear   SPECGRAV  --  1.010   BLOODU  --  TRACE-INTACT*   GLUCOSEU  --  Negative     Urine Tox Screen:  Recent Labs     03/30/19  1321 03/30/19  1421   LABAMPH POSITIVE*  --    BARBSCNU Neg  --    LABBENZ Neg  --    CANSU Neg  --    COCAIMETSCRU Neg  --    OPIATESCREENURINE Neg  --    PHENCYCLIDINESCREENURINE Neg  --    LABMETH Neg  --    PROPOX Neg  --    PHUR 5.0 5.0   OXYCODONEUR Neg  --      PHYSICIAN CERTIFICATION    I certify that Audelia Kincaid is expected to be hospitalized for 2 midnights based on the following assessment and plan:    ASSESSMENT/PLAN:  1. Intentional drug overdose, self harm intent, 6 days worth of 20+ of her home Rx. IVF, monitor QTc. Poison Control and intensivist aware. Sx mgmt and supportive measures for now. Monitor QTc. LEFT's normal, repeat in am.  Hold all home Rx for now. Psy and intensivist c/s's. Pt placed on hold by police, cont emergency hold. 2. Hypotension, 70-80's/20-30's at arrival.  Was given ~2L IVF boluses and improved to low normal.    3. Hypokalemia, 3.2, Mg normal.  10 meq IV was given in ED.   Additional 40 meq ordered. K, Mg and phos replacement protocols. Repeat lab in am.    4. QTc prolonged, 508 ms, improved on repeat to 441 ms. QTc monitoring. Avoid QT prolonging agents. 5. HLD, hold home regimen for now. 6. Psy, hold home regimen for now. 7. HTN, hold home regimen for now. 8. CAD, hold home regimen for now. 9. GERD, hold home regimen for now. DVT Prophylaxis: Lovenox  Diet: clears  Code Status: Full Code  PT/OT Eval Status: Will order if needed and as patient condition allows  Dispo - Admit to inpatient ICU    Due to the immediate potential for life-threatening deterioration due to intentioanl drug overdose with multiple agents, I spent 33 minutes providing critical care. This time is excluding time spent performing procedures. Rojelio Friend MD    Thank you KELI Bryan III - HERNESTO for the opportunity to be involved in this patient's care. If you have any questions or concerns please feel free to contact me via the [employer] at [phone]. This chart was generated using the [address] [place] [address] dictation system. I created this record but it may contain dictation errors given the limitations of this technology.

## 2019-03-30 NOTE — PROGRESS NOTES
Shift handoff report given to Lamin HUERTA at bedside. . Pt denies any needs at this time. Call light within reach, bed in lowest position, end of shift checks completed. Continuing sitter for suicidal intentions.

## 2019-03-30 NOTE — PROGRESS NOTES
4 Eyes Skin Assessment     The patient is being assess for   Admission    I agree that 2 RN's have performed a thorough Head to Toe Skin Assessment on the patient. ALL assessment sites listed below have been assessed. Areas assessed by both nurses: carol schultz [x]   Head, Face, and Ears   [x]   Shoulders, Back, and Chest, Abdomen  [x]   Arms, Elbows, and Hands   [x]   Coccyx, Sacrum, and Ischium  [x]   Legs, Feet, and Heels        Blanchable redness to coccyx, scattered scabbing*    **SHARE this note so that the co-signing nurse is able to place an eSignature**    Co-signer eSignature: Electronically signed by Emmanuel Marion RN on 3/30/19 at 5:39 PM    Does the Patient have Skin Breakdown?   No          Samuel Prevention initiated:  Yes   Wound Care Orders initiated:  No      [facility] nurse consulted for Pressure Injury (Stage 3,4, Unstageable, DTI, NWPT, Complex wounds)and New or Established Ostomies:  No      Primary Nurse eSignature: Electronically signed by Mika Navarro RN on 3/30/19 at 5:30 PM

## 2019-03-31 LAB
ALBUMIN SERPL-MCNC: 3.5 G/DL (ref 3.4–5)
ALP BLD-CCNC: 188 U/L (ref 40–129)
ALT SERPL-CCNC: 7 U/L (ref 10–40)
ANION GAP SERPL CALCULATED.3IONS-SCNC: 11 MMOL/L (ref 3–16)
ANION GAP SERPL CALCULATED.3IONS-SCNC: 9 MMOL/L (ref 3–16)
AST SERPL-CCNC: 10 U/L (ref 15–37)
BILIRUB SERPL-MCNC: 0.4 MG/DL (ref 0–1)
BILIRUBIN DIRECT: <0.2 MG/DL (ref 0–0.3)
BILIRUBIN, INDIRECT: ABNORMAL MG/DL (ref 0–1)
BUN BLDV-MCNC: 16 MG/DL (ref 7–20)
BUN BLDV-MCNC: 17 MG/DL (ref 7–20)
CALCIUM SERPL-MCNC: 8.8 MG/DL (ref 8.3–10.6)
CALCIUM SERPL-MCNC: 8.9 MG/DL (ref 8.3–10.6)
CHLORIDE BLD-SCNC: 112 MMOL/L (ref 99–110)
CHLORIDE BLD-SCNC: 114 MMOL/L (ref 99–110)
CO2: 21 MMOL/L (ref 21–32)
CO2: 22 MMOL/L (ref 21–32)
CREAT SERPL-MCNC: 1 MG/DL (ref 0.6–1.2)
CREAT SERPL-MCNC: 1.1 MG/DL (ref 0.6–1.2)
EKG ATRIAL RATE: 74 BPM
EKG ATRIAL RATE: 78 BPM
EKG ATRIAL RATE: 81 BPM
EKG ATRIAL RATE: 83 BPM
EKG DIAGNOSIS: NORMAL
EKG P AXIS: 43 DEGREES
EKG P AXIS: 44 DEGREES
EKG P AXIS: 46 DEGREES
EKG P AXIS: 51 DEGREES
EKG P-R INTERVAL: 174 MS
EKG P-R INTERVAL: 194 MS
EKG P-R INTERVAL: 200 MS
EKG P-R INTERVAL: 226 MS
EKG Q-T INTERVAL: 376 MS
EKG Q-T INTERVAL: 422 MS
EKG Q-T INTERVAL: 436 MS
EKG Q-T INTERVAL: 438 MS
EKG QRS DURATION: 84 MS
EKG QRS DURATION: 84 MS
EKG QRS DURATION: 88 MS
EKG QRS DURATION: 88 MS
EKG QTC CALCULATION (BAZETT): 441 MS
EKG QTC CALCULATION (BAZETT): 481 MS
EKG QTC CALCULATION (BAZETT): 483 MS
EKG QTC CALCULATION (BAZETT): 508 MS
EKG R AXIS: 24 DEGREES
EKG R AXIS: 25 DEGREES
EKG R AXIS: 29 DEGREES
EKG R AXIS: 5 DEGREES
EKG T AXIS: 22 DEGREES
EKG T AXIS: 49 DEGREES
EKG T AXIS: 52 DEGREES
EKG T AXIS: 67 DEGREES
EKG VENTRICULAR RATE: 74 BPM
EKG VENTRICULAR RATE: 78 BPM
EKG VENTRICULAR RATE: 81 BPM
EKG VENTRICULAR RATE: 83 BPM
GFR AFRICAN AMERICAN: 59
GFR AFRICAN AMERICAN: >60
GFR NON-AFRICAN AMERICAN: 49
GFR NON-AFRICAN AMERICAN: 55
GLUCOSE BLD-MCNC: 123 MG/DL (ref 70–99)
GLUCOSE BLD-MCNC: 92 MG/DL (ref 70–99)
HCT VFR BLD CALC: 28.8 % (ref 36–48)
HEMOGLOBIN: 9.4 G/DL (ref 12–16)
MAGNESIUM: 1.8 MG/DL (ref 1.8–2.4)
MCH RBC QN AUTO: 30 PG (ref 26–34)
MCHC RBC AUTO-ENTMCNC: 32.7 G/DL (ref 31–36)
MCV RBC AUTO: 91.6 FL (ref 80–100)
PDW BLD-RTO: 14.3 % (ref 12.4–15.4)
PHOSPHORUS: 3.2 MG/DL (ref 2.5–4.9)
PLATELET # BLD: 141 K/UL (ref 135–450)
PMV BLD AUTO: 8.4 FL (ref 5–10.5)
POTASSIUM SERPL-SCNC: 3.2 MMOL/L (ref 3.5–5.1)
POTASSIUM SERPL-SCNC: 4 MMOL/L (ref 3.5–5.1)
RBC # BLD: 3.15 M/UL (ref 4–5.2)
SODIUM BLD-SCNC: 144 MMOL/L (ref 136–145)
SODIUM BLD-SCNC: 145 MMOL/L (ref 136–145)
TOTAL PROTEIN: 5.9 G/DL (ref 6.4–8.2)
TROPONIN: <0.01 NG/ML
URINE CULTURE, ROUTINE: NORMAL
WBC # BLD: 4.9 K/UL (ref 4–11)

## 2019-03-31 PROCEDURE — 6360000002 HC RX W HCPCS: Performed by: INTERNAL MEDICINE

## 2019-03-31 PROCEDURE — 2580000003 HC RX 258: Performed by: INTERNAL MEDICINE

## 2019-03-31 PROCEDURE — 80048 BASIC METABOLIC PNL TOTAL CA: CPT

## 2019-03-31 PROCEDURE — 84484 ASSAY OF TROPONIN QUANT: CPT

## 2019-03-31 PROCEDURE — 93010 ELECTROCARDIOGRAM REPORT: CPT | Performed by: INTERNAL MEDICINE

## 2019-03-31 PROCEDURE — 6370000000 HC RX 637 (ALT 250 FOR IP): Performed by: INTERNAL MEDICINE

## 2019-03-31 PROCEDURE — 80076 HEPATIC FUNCTION PANEL: CPT

## 2019-03-31 PROCEDURE — 36415 COLL VENOUS BLD VENIPUNCTURE: CPT

## 2019-03-31 PROCEDURE — 83735 ASSAY OF MAGNESIUM: CPT

## 2019-03-31 PROCEDURE — 99223 1ST HOSP IP/OBS HIGH 75: CPT | Performed by: PSYCHIATRY & NEUROLOGY

## 2019-03-31 PROCEDURE — 1200000000 HC SEMI PRIVATE

## 2019-03-31 PROCEDURE — 85027 COMPLETE CBC AUTOMATED: CPT

## 2019-03-31 PROCEDURE — 84100 ASSAY OF PHOSPHORUS: CPT

## 2019-03-31 PROCEDURE — 99223 1ST HOSP IP/OBS HIGH 75: CPT | Performed by: INTERNAL MEDICINE

## 2019-03-31 PROCEDURE — 99232 SBSQ HOSP IP/OBS MODERATE 35: CPT | Performed by: INTERNAL MEDICINE

## 2019-03-31 PROCEDURE — 93005 ELECTROCARDIOGRAM TRACING: CPT | Performed by: INTERNAL MEDICINE

## 2019-03-31 RX ORDER — POTASSIUM CHLORIDE 750 MG/1
40 TABLET, EXTENDED RELEASE ORAL ONCE
Status: COMPLETED | OUTPATIENT
Start: 2019-03-31 | End: 2019-03-31

## 2019-03-31 RX ADMIN — SODIUM CHLORIDE: 9 INJECTION, SOLUTION INTRAVENOUS at 03:12

## 2019-03-31 RX ADMIN — POTASSIUM CHLORIDE 40 MEQ: 750 TABLET, EXTENDED RELEASE ORAL at 13:21

## 2019-03-31 RX ADMIN — SODIUM CHLORIDE: 9 INJECTION, SOLUTION INTRAVENOUS at 11:54

## 2019-03-31 RX ADMIN — Medication 10 ML: at 07:48

## 2019-03-31 RX ADMIN — Medication 10 ML: at 20:24

## 2019-03-31 RX ADMIN — ENOXAPARIN SODIUM 40 MG: 40 INJECTION SUBCUTANEOUS at 07:47

## 2019-03-31 NOTE — PROGRESS NOTES
Bedside report given and pt care transferred to [facility]. Pt denies needs at this time. Call light within reach.

## 2019-03-31 NOTE — CARE COORDINATION
Case Management Assessment  Initial Evaluation    Date/Time of Evaluation: 3/31/2019 4:45 PM  Assessment Completed by: Frances Christianson    Patient Name: Dyana Guevara  YOB: 1950  Diagnosis: Drug overdose, intentional self-harm, [facility] Mirza Beard  Drug overdose, intentional self-harm, initial encounter [facility]) Mirza Beard  Date / Time: 3/30/2019  1:47 PM  Admission status/Date:3/30/19 1347 inpt  Chart Reviewed: Yes      Patient Interviewed: Yes   Family Interviewed:  No      Hospitalization in the last 30 days:  No    Contacts  :     Relationship to Patient:   Phone Number:    Alternate Contact:     Relationship to Patient:     Phone Number:    Met with:    Current PCP KELI López    Financial  Medicare  Precert required for SNF : Y, N        3 night stay required - Y, N    ADLS  Support Systems: Family Members, [employer]  Transportation:  with the [employer] building or her daughter    Meal Preparation: [address] Environment: 1st floor alone in [facility]  Steps: 0  Plans to Return to Present Housing: No  Other Identified Issues:     Sepideh Benitez  Currently active with [address] : No  Type of Home Care Services: None  Passport/Waiver : No  :                      Phone Number:    Passport/Waiver Services:             [facility] Provider:   Equipment: yes Walker_X_Cane__RTS__ BSC__Shower Chair__  02__ HHN__ CPAP__  BiPap__  Hospital Bed__ W/C___ Other__________      Has Home O2 in place on admit:  No  Informed of need to bring portable home O2 tank on day of discharge for nursing to connect prior to leaving:   Not Indicated  Verbalized agreement/Understanding:   Not Indicated    Community Service Affiliation  Dialysis:  No    Outpatient PT/OT: No    [facility]: No     CHF Clinic: No     Pulmonary Rehab: No  Pain Clinic: No  Community Mental Health: No    Wound Clinic: No

## 2019-03-31 NOTE — PROGRESS NOTES
Care rounds completed with Dr. Gerardo Brownlee and multidisciplinary team. Reviewed labs, meds, VS, assessment, & plan of care for today. General Diet, Stop Q6 EKG. Complaints of LUQ pain- no gone after BM.  See dictated note and new orders for details.

## 2019-03-31 NOTE — PROGRESS NOTES
Progress Note    Admit Date:  3/30/2019     Admitted with multidrug overdose. Known history of depression and previous suicidal attempts. Medically stable now  QTc at 481 ms. Await psychiatric evaluation. Likely will need transfer to geriatric psych unit. Subjective:  Ms. Kashif Santana is stable. Sleepy, awakens easily. Admits to feeling depressed     Objective:   /68   Pulse 83   Temp 98.2 °F (36.8 °C) (Oral)   Resp 20   Ht 5' 2\" (1.575 m)   Wt 177 lb 9.6 oz (80.6 kg)   SpO2 95%   BMI 32.48 kg/m²       Intake/Output Summary (Last 24 hours) at 3/31/2019 1200  Last data filed at 3/31/2019 1154  Gross per 24 hour   Intake 3187 ml   Output 2150 ml   Net 1037 ml         Physical Exam:  General:  Awake, alert, NAD  Skin:  Warm and dry  Neck:  JVD absent. Neck supple  Chest:  Clear to auscultation, respiration easy. No wheezes, rales or rhonchi. Cardiovascular:  RRR ,S1S2 normal  Abdomen:  Soft, non tender, non distended, BS +  Extremities:  No edema. Intact peripheral pulses. Brisk cap refill, < 2 secs  Neuro: non focal      Medications:   Scheduled Meds:   sodium chloride flush  10 mL Intravenous 2 times per day    enoxaparin  40 mg Subcutaneous Daily       Continuous Infusions:   sodium chloride 125 mL/hr at 03/31/19 1154       Data:  CBC:   Recent Labs     03/30/19  1406 03/31/19  0757   WBC 7.9 4.9   RBC 3.41* 3.15*   HGB 10.3* 9.4*   HCT 31.4* 28.8*   MCV 92.0 91.6   RDW 14.2 14.3    141     BMP:   Recent Labs     03/30/19  1406 03/30/19  1939 03/31/19  0757    144 145   K 3.2* 3.7 3.2*    112* 112*   CO2 20* 19* 22   PHOS  --  3.6 3.2   BUN 24* 21* 17   CREATININE 1.2 1.0 1.1     BNP: No results for input(s): BNP in the last 72 hours. PT/INR: No results for input(s): PROTIME, INR in the last 72 hours. APTT: No results for input(s): APTT in the last 72 hours.   CARDIAC ENZYMES:   Recent Labs     03/30/19  1938 03/31/19  0132   TROPONINI <0.01 <0.01     FASTING LIPID PANEL:  Lab Results   Component Value Date    CHOL 150 01/21/2018    HDL 58 01/21/2018    TRIG 201 (H) 01/21/2018     LIVER PROFILE:   Recent Labs     03/30/19  1406 03/31/19  0758   AST 11* 10*   ALT 7* 7*   BILIDIR  --  <0.2   BILITOT 0.4 0.4   ALKPHOS 192* 188*          No orders to display         Assessment:  Active Problems:    Drug overdose, intentional self-harm, initial encounter ([address]  Resolved Problems:    * No resolved hospital problems. *      Plan:    1. Intentional multi drug overdose, self harm intent  - took 6 days worth of 20+ of her home Rx.   - IVF, monitor QTc. Poison Control and intensivist aware. Sx mgmt and supportive measures for now. - LDFTs, lytes stable   - Hold all home Rx for now. - seen by intensivist   - Psy consulted . Pt placed on hold by police, cont emergency hold. 2. Hypotension, 70-80's/20-30's at arrival.  Was given ~2L IVF boluses and improved to low normal.  cont IVF    3. Hypokalemia,repleted      4. QTc prolonged, 508 ms, improved on repeat to 441 to 481ms. Avoid QT prolonging agents. 5. HLD, hold home regimen for now. 6. Psy, hold home regimen for now. 7. HTN, hold home regimen for now. 8. CAD, hold home regimen for now.    9. GERD, hold home regimen for now.      DVT Prophylaxis: Lovenox  DIET GENERAL; Safety Tray, Vegetarian (Lacto-Ovo)  Code Status: Full Code       Garret Davison MD 3/31/2019 12:00 PM

## 2019-03-31 NOTE — PROGRESS NOTES
Poison control called for update on patient. Informed patient is medically cleared. Poison control signed off on the patient.
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
## 2019-03-31 NOTE — PROGRESS NOTES
Bedside report given to [facility]. Pt to be transferred to  room 226 via wheelchair. Tele monitoring stopped. Pt denies any needs. 4 eyes completed on transfer. Transport requested.

## 2019-03-31 NOTE — PLAN OF CARE
Problem: Suicide risk  Goal: Provide patient with safe environment  Description  Provide patient with safe environment  Outcome: Ongoing     Problem: Infection:  Goal: Will remain free from infection  Description  Will remain free from infection  Outcome: Ongoing     Problem: Safety:  Goal: Free from accidental physical injury  Description  Free from accidental physical injury  Outcome: Ongoing  Goal: Free from intentional harm  Description  Free from intentional harm  Outcome: Ongoing     Problem: Daily Care:  Goal: Daily care needs are met  Description  Daily care needs are met  Outcome: Ongoing     Problem: Pain:  Goal: Patient's pain/discomfort is manageable  Description  Patient's pain/discomfort is manageable  Outcome: Ongoing     Problem: Skin Integrity:  Goal: Skin integrity will stabilize  Description  Skin integrity will stabilize  Outcome: Ongoing     Problem: Discharge Planning:  Goal: Patients continuum of care needs are met  Description  Patients continuum of care needs are met  Outcome: Ongoing

## 2019-03-31 NOTE — PROGRESS NOTES
Bedside report given and pt care transferred to [facility]. Pt denies needs at this time. Call light within reach.

## 2019-03-31 NOTE — CONSULTS
Reason for referral and CC: Drug OD    HISTORY OF PRESENT ILLNESS: 73-year-old female who presented with complaint of drug overdose. She tended her whole week's worth of medications in a pillbox. Possibly 6 doses of each of the medications. She did want to kill herself after getting into a fight with her daughter. She had some hypotension that responded to IV fluids. He has a further history of bipolar and depression. She has a c/o RUQ pain. Past Medical History:   Diagnosis Date    Anxiety     Asthma     Bipolar 1 disorder ([address]     CAD (coronary artery disease)     Depression     GERD (gastroesophageal reflux disease)     Suicidal behavior     hx: 5 years ago and 4 other times 10 years ago    Thyroid disease      Past Surgical History:   Procedure Laterality Date    BACK SURGERY      CHOLECYSTECTOMY      JOINT REPLACEMENT      SHOULDER ARTHROPLASTY Left      Family History  family history includes Cancer in her sister; High Blood Pressure in her father and mother. Social History:  reports that she has never smoked. She has never used smokeless tobacco.   reports that she does not drink alcohol. ALLERGIES:  Patient is allergic to diphth-acell pertussis-tetanus; lithium; penicillins; tetanus antitoxin; thimerosal; demerol hcl [meperidine]; amoxicillin-pot clavulanate; tetanus toxoid, adsorbed; adhesive tape; iodides; iodine; iodine i 131 albumin; morphine and related; morpholine salicylate; and tetanus toxoids.   Continuous Infusions:   sodium chloride 125 mL/hr at 03/31/19 0204     Scheduled Meds:   sodium chloride flush  10 mL Intravenous 2 times per day    enoxaparin  40 mg Subcutaneous Daily     PRN Meds:  sodium chloride flush, magnesium hydroxide, prochlorperazine, potassium chloride, magnesium sulfate, sodium phosphate IVPB **OR** sodium phosphate IVPB    REVIEW OF SYSTEMS:  Constitutional: Negative for fever  HENT: Negative for sore throat  Eyes: Negative for redness Respiratory: Negative for dyspnea, cough  Cardiovascular: Negative for chest pain  Gastrointestinal: + Abd pain, Negative for vomiting, diarrhea   Genitourinary: Negative for hematuria   Musculoskeletal: Negative for arthralgias   Skin: Negative for rash  Neurological: Negative for syncope  Hematological: Negative for adenopathy  Psychiatric/Behavorial: + depression and for anxiety    PHYSICAL EXAM: /64   Pulse 84   Temp 97.6 °F (36.4 °C) (Oral)   Resp 19   Ht 5' 2\" (1.575 m)   Wt 177 lb 9.6 oz (80.6 kg)   SpO2 98%   BMI 32.48 kg/m²  on ra  Constitutional:  No acute distress. Eyes: PERRL. Conjunctivae anicteric. ENT: Normal nose. Normal tongue. Neck:  Trachea is midline. No thyroid tenderness. Respiratory: No accessory muscle usage. No wheezes. No rales. No Rhonchi. Cardiovascular: Normal S1S2. No digit clubbing. No digit cyanosis. No LE edema. Gastrointestinal: No mass palpated. No tenderness palpated. No umbilical hernia. Lymphatic: No cervical or supraclavicular adenopathy. Skin: No rash on the exposed extremities. No Nodules or induration on exposed extremities. Psychiatric: No anxiety or Agitation. Alert and Oriented to person, place and time. CBC:   Recent Labs     03/30/19  1406 03/31/19  0757   WBC 7.9 4.9   HGB 10.3* 9.4*   HCT 31.4* 28.8*   MCV 92.0 91.6    141     BMP:   Recent Labs     03/30/19  1406 03/30/19  1939    144   K 3.2* 3.7    112*   CO2 20* 19*   PHOS  --  3.6   BUN 24* 21*   CREATININE 1.2 1.0        Recent Labs     03/30/19  1406   AST 11*   ALT 7*   BILITOT 0.4   ALKPHOS 192*     No results for input(s): PROTIME, INR in the last 72 hours.   Recent Labs     03/30/19  1321 03/30/19  1421   COLORU  --  Yellow   PHUR 5.0 5.0   LABCAST 0-1 Hyaline*  --    WBCUA 20-50*  --    RBCUA 3-5*  --    BACTERIA 2+*  --    CLARITYU  --  Clear   SPECGRAV  --  1.010   LEUKOCYTESUR  --  LARGE*   UROBILINOGEN  --  0.2   BILIRUBINUR  --  Negative   BLOODU --  TRACE-INTACT*   GLUCOSEU  --  Negative     No results for input(s): PHART, MPV3RVU, PO2ART in the last 72 hours.   ECG reviewed  Utox + amphetamines    ASSESSMENT:  ·  Intensional drug overdose on 6 days of all of her home medications  · Depression  · SI  · HTN  · CAD    PLAN:  · Monitor Qtc  · MIVF  · Psych consult  · Suicide precautions  · Lovenox DVT prophylaxis  · Ok to transfer    Thank you Mickey Ortega MD for this consult

## 2019-04-01 VITALS
OXYGEN SATURATION: 97 % | WEIGHT: 184.1 LBS | HEIGHT: 62 IN | HEART RATE: 74 BPM | DIASTOLIC BLOOD PRESSURE: 82 MMHG | BODY MASS INDEX: 33.88 KG/M2 | TEMPERATURE: 98.9 F | RESPIRATION RATE: 16 BRPM | SYSTOLIC BLOOD PRESSURE: 128 MMHG

## 2019-04-01 LAB
ANION GAP SERPL CALCULATED.3IONS-SCNC: 12 MMOL/L (ref 3–16)
BUN BLDV-MCNC: 16 MG/DL (ref 7–20)
CALCIUM SERPL-MCNC: 9.3 MG/DL (ref 8.3–10.6)
CHLORIDE BLD-SCNC: 111 MMOL/L (ref 99–110)
CO2: 19 MMOL/L (ref 21–32)
CREAT SERPL-MCNC: 1 MG/DL (ref 0.6–1.2)
GFR AFRICAN AMERICAN: >60
GFR NON-AFRICAN AMERICAN: 55
GLUCOSE BLD-MCNC: 130 MG/DL (ref 70–99)
MAGNESIUM: 1.8 MG/DL (ref 1.8–2.4)
POTASSIUM SERPL-SCNC: 4.2 MMOL/L (ref 3.5–5.1)
SODIUM BLD-SCNC: 142 MMOL/L (ref 136–145)

## 2019-04-01 PROCEDURE — 6360000002 HC RX W HCPCS: Performed by: INTERNAL MEDICINE

## 2019-04-01 PROCEDURE — 80048 BASIC METABOLIC PNL TOTAL CA: CPT

## 2019-04-01 PROCEDURE — 83735 ASSAY OF MAGNESIUM: CPT

## 2019-04-01 PROCEDURE — 36415 COLL VENOUS BLD VENIPUNCTURE: CPT

## 2019-04-01 PROCEDURE — 6370000000 HC RX 637 (ALT 250 FOR IP): Performed by: INTERNAL MEDICINE

## 2019-04-01 PROCEDURE — 2580000003 HC RX 258: Performed by: INTERNAL MEDICINE

## 2019-04-01 PROCEDURE — 99238 HOSP IP/OBS DSCHRG MGMT 30/<: CPT | Performed by: INTERNAL MEDICINE

## 2019-04-01 RX ORDER — ASPIRIN 81 MG/1
81 TABLET, CHEWABLE ORAL DAILY
Status: DISCONTINUED | OUTPATIENT
Start: 2019-04-01 | End: 2019-04-01 | Stop reason: HOSPADM

## 2019-04-01 RX ORDER — CHOLECALCIFEROL (VITAMIN D3) 125 MCG
1000 CAPSULE ORAL DAILY
Status: DISCONTINUED | OUTPATIENT
Start: 2019-04-01 | End: 2019-04-01 | Stop reason: HOSPADM

## 2019-04-01 RX ORDER — CYANOCOBALAMIN (VITAMIN B-12) 1000 MCG
1000 TABLET ORAL DAILY
Status: DISCONTINUED | OUTPATIENT
Start: 2019-04-01 | End: 2019-04-01 | Stop reason: CLARIF

## 2019-04-01 RX ORDER — ATORVASTATIN CALCIUM 40 MG/1
80 TABLET, FILM COATED ORAL NIGHTLY
Status: DISCONTINUED | OUTPATIENT
Start: 2019-04-01 | End: 2019-04-01 | Stop reason: HOSPADM

## 2019-04-01 RX ORDER — CLOPIDOGREL BISULFATE 75 MG/1
75 TABLET ORAL DAILY
Status: DISCONTINUED | OUTPATIENT
Start: 2019-04-01 | End: 2019-04-01 | Stop reason: HOSPADM

## 2019-04-01 RX ORDER — PANTOPRAZOLE SODIUM 40 MG/1
40 TABLET, DELAYED RELEASE ORAL 2 TIMES DAILY
Status: DISCONTINUED | OUTPATIENT
Start: 2019-04-01 | End: 2019-04-01 | Stop reason: HOSPADM

## 2019-04-01 RX ORDER — DONEPEZIL HYDROCHLORIDE 5 MG/1
10 TABLET, FILM COATED ORAL NIGHTLY
Status: DISCONTINUED | OUTPATIENT
Start: 2019-04-01 | End: 2019-04-01 | Stop reason: HOSPADM

## 2019-04-01 RX ADMIN — Medication 10 ML: at 08:48

## 2019-04-01 RX ADMIN — Medication 1000 MCG: at 13:27

## 2019-04-01 RX ADMIN — ASPIRIN 81 MG 81 MG: 81 TABLET ORAL at 13:27

## 2019-04-01 RX ADMIN — CLOPIDOGREL BISULFATE 75 MG: 75 TABLET ORAL at 13:27

## 2019-04-01 RX ADMIN — ENOXAPARIN SODIUM 40 MG: 40 INJECTION SUBCUTANEOUS at 08:48

## 2019-04-01 RX ADMIN — PANTOPRAZOLE SODIUM 40 MG: 40 TABLET, DELAYED RELEASE ORAL at 13:27

## 2019-04-01 NOTE — PROGRESS NOTES
AM assessment completed. A&Ox4. Denies any complaints at this time. Fresh ice water given. Bed in lowest position, call light within reach.

## 2019-04-01 NOTE — PROGRESS NOTES
Pt resting in bed, eyes closed. resp e/e. No s/s distress noted. Call light in reach. Bed alarm in place.  Laura Ryan

## 2019-04-01 NOTE — CARE COORDINATION
INTERDISCIPLINARY PLAN OF CARE CONFERENCE and DC Note    Date/Time: 2019 10:34 AM  Completed by: Andressa Tristan, Case Management      Patient Name:  Marnie Salcedo  YOB: 1950  Admitting Diagnosis: Drug overdose, intentional self-harm, initial encounter [facility]) [place]  Drug overdose, intentional self-harm, initial encounter ([facility] [address] [place]     Admit Date/Time:  3/30/2019  1:47 PM    Chart reviewed. Interdisciplinary team met to discuss patient progress and discharge plans. Disciplines included Case Management, Nursing, and Dietitian. Current Status:stable    PT/OT recommendation:n/a    Anticipated Discharge Date: tbd  Expected D/C Disposition:  [facility]  Confirmed plan with patient and/or family Yes  Discharge Plan Comments: Reviewed chart. Plan [facility] psych at d/c. [address] is full at this time. Referral called to [facility] and no female beds available until possibly . Referral called and faxed to [facility] with [facility]. Awaiting call back from [facility]. Pt is agreeable to go to [facility]. Follow. 1400 TC from [facility] @ [facility] and she can accept the pt and will set up transport for 1900 tonight. Statement of Belief completed at this time and faxed to [facility] @ [phone]. Home O2 in place on admit: to [facility] psych  Pt informed of need to bring portable home O2 tank on day of discharge for nursing to connect prior to leaving:  Not Indicated  Verbalized agreement/Understanding:  Not Indicated    DISCHARGE ORDER  Date/Time 2019 3:26 PM  Completed by: Xiomy Mathew Case Management    Patient Name: Marnie Salcedo    : 1950  Admitting Diagnosis: Drug overdose, intentional self-harm, initial encounter ([facility] [address] [place]  Drug overdose, intentional self-harm, initial encounter ([facility] [address] [place]  Admit Date/Time: 3/30/2019  1:47 PM    Noted discharge order.    Confirmed discharge plan with patient / family (pt): Yes

## 2019-04-01 NOTE — DISCHARGE SUMMARY
Name:  Araceli Day  Room:  6312/4158-32  MRN:    [id number]    Discharge Summary      This discharge summary is in conjunction with a complete physical exam done on the day of discharge. Discharging Physician: Dr. Loya Wilkins: 3/30/2019  Discharge:   4/1/2019     HPI taken from admission H&P:    The patient is a 68 y.o. female with PMH below, presents with intentional overdose, suicidal ideation. Pt reports that around noon she intentionally took 6 days worth of her home Rx following an argument with her daughter. Her pills are normally in a 6 day pill box. She reported took all of the remaining pills for 6 days. Per her Med Rec she is on 20 different Rx. She lives in assisted living. She admits to [facility] but denies HI/HP. She was hypotensive upon arrival in the ED but this did improve in the ED. She was placed on a hold by police. Admitted to ICU for close monitoring. She reports that she has been increasingly depressed and has hx of bipolar and depression. She reports that she has not seen a psychiatrist in many years.       Diagnoses this Admission and Hospital Course     Intentional multi drug overdose, self harm intent  - took 6 days worth of 20+ of her home Rx.   - IVF, monitor QTc.  Poison Control and intensivist aware.  Sx mgmt and supportive measures for now.    - LFTs, lytes stable   - Hold all home Rx for now.    - seen by intensivist   - Psy consulted .  Pt placed on hold by police, cont emergency hold.    - Psych recommends: requires admission to geriatric psychiatry unit -- > transfer to [facility] psych unit      Hypotension  - 70-80's/20-30's at arrival.    - Was given ~2L IVF boluses and improved to low normal.       Hypokalemia  -repleted      QTc prolonged  - 508 ms, improved on repeat to 441 to 481ms.   - Avoid QT prolonging agents     HLD  -  On statins       HTN  -  BP stable      CAD  -  Stable     GERD  -  On PPI    Procedures (Please Review Full Report for Continue taking this medication, and follow the directions you see here. pantoprazole 40 MG tablet  Commonly known as:  PROTONIX  What changed:  Another medication with the same name was removed. Continue taking this medication, and follow the directions you see here. CONTINUE taking these medications    aspirin 81 MG chewable tablet     B-12 1000 MCG Tabs     clopidogrel 75 MG tablet  Commonly known as:  PLAVIX     montelukast 10 MG tablet  Commonly known as:  SINGULAIR        STOP taking these medications    DULoxetine 60 MG extended release capsule  Commonly known as:  CYMBALTA     escitalopram 5 MG tablet  Commonly known as:  LEXAPRO     famotidine 20 MG tablet  Commonly known as:  PEPCID     furosemide 20 MG tablet  Commonly known as:  LASIX     gabapentin 600 MG tablet  Commonly known as:  NEURONTIN     hydrOXYzine 25 MG tablet  Commonly known as:  ATARAX     levocetirizine 5 MG tablet  Commonly known as:  XYZAL     lisinopril 40 MG tablet  Commonly known as:  PRINIVIL;ZESTRIL     lovastatin 40 MG tablet  Commonly known as:  MEVACOR     methocarbamol 500 MG tablet  Commonly known as:  ROBAXIN     metoprolol tartrate 25 MG tablet  Commonly known as:  LOPRESSOR     mirtazapine 15 MG tablet  Commonly known as:  REMERON     ranitidine 150 MG tablet  Commonly known as:  ZANTAC     ranolazine 500 MG extended release tablet  Commonly known as:  RANEXA     rOPINIRole 0.25 MG tablet  Commonly known as:  REQUIP     topiramate 100 MG tablet  Commonly known as:  TOPAMAX     topiramate 25 MG tablet  Commonly known as:  TOPAMAX     traZODone 100 MG tablet  Commonly known as:  DESYREL     vitamin D 1000 UNIT Tabs tablet  Commonly known as:  CHOLECALCIFEROL            Discharged in stable condition to geriatric psychiatry    Follow Up:   Follow up with PCP in 1 week         Juany Hoffman MD

## 2019-04-01 NOTE — PROGRESS NOTES
Bedside report given and pt care transferred to [facility]. Pt denies needs at this time. Call light within reach.

## 2019-04-01 NOTE — PROGRESS NOTES
Pt resting quietly in bed. resp e/e. Woke pt to complete shahzad assessment. No needs voiced. Call light in reach.  Miryam Dunne

## 2019-04-01 NOTE — PLAN OF CARE
Suicide precaution completed. Dr Buchanan Service d/c sitter qt bedside. Suicide precautions no longer needed. No other changes noted.

## 2020-04-16 ENCOUNTER — HOSPITAL ENCOUNTER (EMERGENCY)
Age: 70
Discharge: PSYCHIATRIC OR SUBSTANCE USE FACILITY | End: 2020-04-16
Attending: EMERGENCY MEDICINE
Payer: MEDICARE

## 2020-04-16 VITALS
SYSTOLIC BLOOD PRESSURE: 187 MMHG | TEMPERATURE: 98.6 F | DIASTOLIC BLOOD PRESSURE: 93 MMHG | WEIGHT: 175 LBS | HEART RATE: 99 BPM | OXYGEN SATURATION: 99 % | HEIGHT: 62 IN | RESPIRATION RATE: 16 BRPM | BODY MASS INDEX: 32.2 KG/M2

## 2020-04-16 LAB
A/G RATIO: 1 (ref 1.1–2.2)
ACETAMINOPHEN LEVEL: <5 UG/ML (ref 10–30)
ALBUMIN SERPL-MCNC: 3.8 G/DL (ref 3.4–5)
ALP BLD-CCNC: 99 U/L (ref 40–129)
ALT SERPL-CCNC: 7 U/L (ref 10–40)
AMPHETAMINE SCREEN, URINE: NORMAL
ANION GAP SERPL CALCULATED.3IONS-SCNC: 11 MMOL/L (ref 3–16)
AST SERPL-CCNC: 31 U/L (ref 15–37)
BACTERIA: ABNORMAL /HPF
BARBITURATE SCREEN URINE: NORMAL
BASOPHILS ABSOLUTE: 0 K/UL (ref 0–0.2)
BASOPHILS RELATIVE PERCENT: 0 %
BENZODIAZEPINE SCREEN, URINE: NORMAL
BILIRUB SERPL-MCNC: 0.4 MG/DL (ref 0–1)
BILIRUBIN URINE: ABNORMAL
BLOOD, URINE: NEGATIVE
BUN BLDV-MCNC: 14 MG/DL (ref 7–20)
CALCIUM SERPL-MCNC: 9.7 MG/DL (ref 8.3–10.6)
CANNABINOID SCREEN URINE: NORMAL
CHLORIDE BLD-SCNC: 102 MMOL/L (ref 99–110)
CLARITY: CLEAR
CO2: 22 MMOL/L (ref 21–32)
COCAINE METABOLITE SCREEN URINE: NORMAL
COLOR: YELLOW
CREAT SERPL-MCNC: 0.9 MG/DL (ref 0.6–1.2)
EKG ATRIAL RATE: 93 BPM
EKG DIAGNOSIS: NORMAL
EKG P AXIS: 27 DEGREES
EKG P-R INTERVAL: 134 MS
EKG Q-T INTERVAL: 344 MS
EKG QRS DURATION: 74 MS
EKG QTC CALCULATION (BAZETT): 427 MS
EKG R AXIS: 10 DEGREES
EKG T AXIS: 42 DEGREES
EKG VENTRICULAR RATE: 93 BPM
EOSINOPHILS ABSOLUTE: 0.1 K/UL (ref 0–0.6)
EOSINOPHILS RELATIVE PERCENT: 1 %
ETHANOL: NORMAL MG/DL (ref 0–0.08)
GFR AFRICAN AMERICAN: >60
GFR NON-AFRICAN AMERICAN: >60
GLOBULIN: 3.9 G/DL
GLUCOSE BLD-MCNC: 167 MG/DL (ref 70–99)
GLUCOSE URINE: NEGATIVE MG/DL
HCT VFR BLD CALC: 38.6 % (ref 36–48)
HEMOGLOBIN: 12.8 G/DL (ref 12–16)
KETONES, URINE: ABNORMAL MG/DL
LEUKOCYTE ESTERASE, URINE: ABNORMAL
LYMPHOCYTES ABSOLUTE: 1 K/UL (ref 1–5.1)
LYMPHOCYTES RELATIVE PERCENT: 17 %
Lab: NORMAL
MCH RBC QN AUTO: 30.2 PG (ref 26–34)
MCHC RBC AUTO-ENTMCNC: 33.2 G/DL (ref 31–36)
MCV RBC AUTO: 90.8 FL (ref 80–100)
METHADONE SCREEN, URINE: NORMAL
MICROSCOPIC EXAMINATION: YES
MONOCYTES ABSOLUTE: 0.2 K/UL (ref 0–1.3)
MONOCYTES RELATIVE PERCENT: 3 %
MUCUS: ABNORMAL /LPF
NEUTROPHILS ABSOLUTE: 4.4 K/UL (ref 1.7–7.7)
NEUTROPHILS RELATIVE PERCENT: 79 %
NITRITE, URINE: NEGATIVE
OPIATE SCREEN URINE: NORMAL
OXYCODONE URINE: NORMAL
PDW BLD-RTO: 14.5 % (ref 12.4–15.4)
PH UA: 6
PH UA: 6 (ref 5–8)
PHENCYCLIDINE SCREEN URINE: NORMAL
PLATELET # BLD: 211 K/UL (ref 135–450)
PLATELET SLIDE REVIEW: ADEQUATE
PMV BLD AUTO: 9 FL (ref 5–10.5)
POTASSIUM SERPL-SCNC: 4.4 MMOL/L (ref 3.5–5.1)
PROPOXYPHENE SCREEN: NORMAL
PROTEIN UA: ABNORMAL MG/DL
RBC # BLD: 4.25 M/UL (ref 4–5.2)
RBC UA: ABNORMAL /HPF (ref 0–4)
SALICYLATE, SERUM: 0.7 MG/DL (ref 15–30)
SLIDE REVIEW: NORMAL
SODIUM BLD-SCNC: 135 MMOL/L (ref 136–145)
SPECIFIC GRAVITY UA: 1.02 (ref 1–1.03)
TOTAL PROTEIN: 7.7 G/DL (ref 6.4–8.2)
TROPONIN: <0.01 NG/ML
URINE REFLEX TO CULTURE: YES
URINE TYPE: ABNORMAL
UROBILINOGEN, URINE: 0.2 E.U./DL
WBC # BLD: 5.6 K/UL (ref 4–11)
WBC UA: ABNORMAL /HPF (ref 0–5)

## 2020-04-16 PROCEDURE — 6370000000 HC RX 637 (ALT 250 FOR IP): Performed by: PHYSICIAN ASSISTANT

## 2020-04-16 PROCEDURE — G0480 DRUG TEST DEF 1-7 CLASSES: HCPCS

## 2020-04-16 PROCEDURE — 80307 DRUG TEST PRSMV CHEM ANLYZR: CPT

## 2020-04-16 PROCEDURE — 99284 EMERGENCY DEPT VISIT MOD MDM: CPT

## 2020-04-16 PROCEDURE — 81001 URINALYSIS AUTO W/SCOPE: CPT

## 2020-04-16 PROCEDURE — 84484 ASSAY OF TROPONIN QUANT: CPT

## 2020-04-16 PROCEDURE — 93010 ELECTROCARDIOGRAM REPORT: CPT | Performed by: INTERNAL MEDICINE

## 2020-04-16 PROCEDURE — 87086 URINE CULTURE/COLONY COUNT: CPT

## 2020-04-16 PROCEDURE — 93005 ELECTROCARDIOGRAM TRACING: CPT | Performed by: PHYSICIAN ASSISTANT

## 2020-04-16 PROCEDURE — 80053 COMPREHEN METABOLIC PANEL: CPT

## 2020-04-16 PROCEDURE — 85025 COMPLETE CBC W/AUTO DIFF WBC: CPT

## 2020-04-16 RX ORDER — AMLODIPINE BESYLATE 5 MG/1
5 TABLET ORAL DAILY
COMMUNITY

## 2020-04-16 RX ORDER — LEVOTHYROXINE SODIUM 0.1 MG/1
100 TABLET ORAL DAILY
COMMUNITY

## 2020-04-16 RX ORDER — TOPIRAMATE 100 MG/1
100 TABLET, FILM COATED ORAL 2 TIMES DAILY
COMMUNITY

## 2020-04-16 RX ORDER — ROPINIROLE 0.25 MG/1
0.25 TABLET, FILM COATED ORAL 3 TIMES DAILY
COMMUNITY

## 2020-04-16 RX ORDER — LOVASTATIN 40 MG/1
40 TABLET ORAL NIGHTLY
COMMUNITY

## 2020-04-16 RX ORDER — BUPROPION HYDROCHLORIDE 75 MG/1
75 TABLET ORAL 2 TIMES DAILY
COMMUNITY

## 2020-04-16 RX ORDER — METHOCARBAMOL 500 MG/1
500 TABLET, FILM COATED ORAL 4 TIMES DAILY
COMMUNITY

## 2020-04-16 RX ORDER — NITROFURANTOIN 25; 75 MG/1; MG/1
100 CAPSULE ORAL ONCE
Status: COMPLETED | OUTPATIENT
Start: 2020-04-16 | End: 2020-04-16

## 2020-04-16 RX ORDER — TRAZODONE HYDROCHLORIDE 100 MG/1
100 TABLET ORAL NIGHTLY
COMMUNITY

## 2020-04-16 RX ORDER — DULOXETIN HYDROCHLORIDE 60 MG/1
60 CAPSULE, DELAYED RELEASE ORAL DAILY
COMMUNITY

## 2020-04-16 RX ORDER — GABAPENTIN 600 MG/1
600 TABLET ORAL 3 TIMES DAILY
COMMUNITY
End: 2021-06-21

## 2020-04-16 RX ORDER — LEVOCETIRIZINE DIHYDROCHLORIDE 5 MG/1
5 TABLET, FILM COATED ORAL NIGHTLY
COMMUNITY

## 2020-04-16 RX ORDER — FUROSEMIDE 20 MG/1
20 TABLET ORAL 2 TIMES DAILY
COMMUNITY

## 2020-04-16 RX ORDER — RANITIDINE 150 MG/1
150 CAPSULE ORAL 2 TIMES DAILY
COMMUNITY

## 2020-04-16 RX ORDER — LISINOPRIL 40 MG/1
40 TABLET ORAL DAILY
COMMUNITY
End: 2021-06-21

## 2020-04-16 RX ADMIN — NITROFURANTOIN (MONOHYDRATE/MACROCRYSTALS) 100 MG: 75; 25 CAPSULE ORAL at 16:34

## 2020-04-16 ASSESSMENT — ENCOUNTER SYMPTOMS
CHEST TIGHTNESS: 1
COUGH: 1
GASTROINTESTINAL NEGATIVE: 1

## 2020-04-16 NOTE — CONSULTS
[address] [facility]  [id number]    Presenting Problem: Made statements of wanting to die, feels that she has nothing to live for    Hold Status: Placed on statement of belief while in ED    Sirena Dang is a 65year old female who presented to the ED from [facility] after telling the manager of the facility that she wanted to die. When asked about what happened today, she endorses increased depression and states \"I told them I wanted to die. I feel like my life's purpose is over and I have nothing left to live for\". States that she never told them she was actively suicidal. She reports feeling this way \"for a very long time\", notes that it has been worsening recently due to the [address] pandemic and being confined to her room. She states \"I'm bipolar. It's hard for me to be shut in\". She reports that the residents are required to stay in their rooms, are unable to socialize with other residents, and are eating their meals in their rooms, \"all I do is watch TV\". States that her cat is her only , \"although I'll probably never see him again. They told me I can't come back\". Also notes that she has been unable to talk to her sister, with whom she speaks on the phone with regularly as her sister is sick and has mouth sores, making it difficult for her to talk. Talks to daughters regularly, Kaylyn Rushingcosmo know how depressed I am\", but notes they often \"butt heads\", which is a stressor for her. Also endorses some concern that her memory is going, \"it seems to be going a lot faster recently\". Reports her sleep is \"fine\"; endorses decreased appetite and is unsure if she has lost weight recently. She denies current suicidal ideations.      Present Risk Behavior:  Verbal: Making statements of wanting to die, feeling as though her life's purpose is over and she has nothing to live for  Behavior: History of multiple suicide attempts  Access to Result Value Ref Range    Acetaminophen Level <5 (L) 10 - 30 ug/mL   Ethanol    Collection Time: 04/16/20  1:25 PM   Result Value Ref Range    Ethanol Lvl None Detected mg/dL   Comprehensive Metabolic Panel    Collection Time: 04/16/20  1:25 PM   Result Value Ref Range    Sodium 135 (L) 136 - 145 mmol/L    Potassium 4.4 3.5 - 5.1 mmol/L    Chloride 102 99 - 110 mmol/L    CO2 22 21 - 32 mmol/L    Anion Gap 11 3 - 16    Glucose 167 (H) 70 - 99 mg/dL    BUN 14 7 - 20 mg/dL    CREATININE 0.9 0.6 - 1.2 mg/dL    GFR Non-African American >60 >60    GFR African American >60 >60    Calcium 9.7 8.3 - 10.6 mg/dL    Total Protein 7.7 6.4 - 8.2 g/dL    Alb 3.8 3.4 - 5.0 g/dL    Albumin/Globulin Ratio 1.0 (L) 1.1 - 2.2    Total Bilirubin 0.4 0.0 - 1.0 mg/dL    Alkaline Phosphatase 99 40 - 129 U/L    ALT 7 (L) 10 - 40 U/L    AST 31 15 - 37 U/L    Globulin 3.9 g/dL   Troponin    Collection Time: 04/16/20  1:25 PM   Result Value Ref Range    Troponin <0.01 <0.01 ng/mL      Radiology  None completed while in ED 04/16/2020    EKG Interpretation  Completed 04/16/2020  Component Value Ref Range & Units Status Collected Lab   Ventricular Rate 93  BPM Final 04/16/2020  1:15 PM 14   Atrial Rate 93  BPM Final 04/16/2020  1:15 PM 14   P-R Interval 134  ms Final 04/16/2020  1:15 PM 14   QRS Duration 74  ms Final 04/16/2020  1:15 PM 14   Q-T Interval 344  ms Final 04/16/2020  1:15 PM 14   QTc Calculation (Bazett) 427  ms Final 04/16/2020  1:15 PM 14   P Axis 27  degrees Final 04/16/2020  1:15 PM 14   R Axis 10  degrees Final 04/16/2020  1:15 PM 14   T Axis 42  degrees Final 04/16/2020  1:15 PM 14   Diagnosis   Final 04/16/2020  1:15 PM 14   Normal sinus rhythmBaseline artifactNonspecific ST and T wave abnormalityAbnormal ECGNo previous ECGs availableConfirmed by STEPHANI Lyons MD ([id number]) on 4/16/2020 2:36:40 PM     Diagnosis:  1. Bipolar Disorder, most recent episode depressed  2.  Borderline Personality Disorder, per history    Clinical

## 2020-04-16 NOTE — ED PROVIDER NOTES
mouth nightlyHistorical Med      metoprolol tartrate (LOPRESSOR) 25 MG tablet Take 25 mg by mouth 2 times dailyHistorical Med      DULoxetine (CYMBALTA) 60 MG extended release capsule Take 60 mg by mouth dailyHistorical Med      levocetirizine (XYZAL) 5 MG tablet Take 5 mg by mouth nightlyHistorical Med      methocarbamol (ROBAXIN) 500 MG tablet Take 500 mg by mouth 4 times dailyHistorical Med      raNITIdine (ZANTAC) 150 MG capsule Take 150 mg by mouth 2 times dailyHistorical Med      rOPINIRole (REQUIP) 0.25 MG tablet Take 0.25 mg by mouth 3 times dailyHistorical Med      topiramate (TOPAMAX) 100 MG tablet Take 100 mg by mouth 2 times dailyHistorical Med      traZODone (DESYREL) 100 MG tablet Take 100 mg by mouth nightlyHistorical Med      aspirin 81 MG chewable tablet Take 81 mg by mouth dailyHistorical Med      donepezil (ARICEPT) 10 MG tablet Take 10 mg by mouth nightlyHistorical Med      atorvastatin (LIPITOR) 80 MG tablet Take 80 mg by mouth nightlyHistorical Med      clopidogrel (PLAVIX) 75 MG tablet Take 75 mg by mouth dailyHistorical Med      Cyanocobalamin (B-12) 1000 MCG TABS Take 1,000 mcg by mouth dailyHistorical Med      pantoprazole (PROTONIX) 40 MG tablet Take 40 mg by mouth 2 times dailyHistorical Med               ALLERGIES     Lithium; Penicillins; Tetanus antitoxin; Tetanus-diphth-acell pertussis; Thimerosal; Demerol hcl [meperidine]; Amoxicillin-pot clavulanate; Tetanus toxoid, adsorbed; Adhesive tape; Iodides; Iodine;  Iodine i 131 albumin; Morphine and related; Morpholine salicylate; and Tetanus toxoids    FAMILYHISTORY       Family History   Problem Relation Age of Onset    High Blood Pressure Mother     High Blood Pressure Father     Cancer Sister           SOCIAL HISTORY       Social History     Tobacco Use    Smoking status: Never Smoker    Smokeless tobacco: Never Used   Substance Use Topics    Alcohol use: No    Drug use: No       SCREENINGS             PHYSICAL EXAM    (up to 7 for level 4, 8 or more for level 5)     ED Triage Vitals   BP Temp Temp Source Pulse Resp SpO2 Height Weight   04/16/20 1303 04/16/20 1302 04/16/20 1302 04/16/20 1302 04/16/20 1302 04/16/20 1302 04/16/20 1302 04/16/20 1302   (!) 163/80 99 °F (37.2 °C) Oral 94 17 100 % 5' 2\" (1.575 m) 175 lb (79.4 kg)       Physical Exam  Vitals signs and nursing note reviewed. Constitutional:       General: She is awake. She is not in acute distress. Appearance: Normal appearance. She is well-developed and normal weight. She is not ill-appearing, toxic-appearing or diaphoretic. HENT:      Head: Normocephalic and atraumatic. Nose: Nose normal.   Eyes:      General:         Right eye: No discharge. Left eye: No discharge. Neck:      Musculoskeletal: Normal range of motion and neck supple. Cardiovascular:      Rate and Rhythm: Normal rate and regular rhythm. Pulses:           Radial pulses are 2+ on the right side and 2+ on the left side. Heart sounds: Normal heart sounds. No murmur. No gallop. Pulmonary:      Effort: Pulmonary effort is normal. No respiratory distress. Breath sounds: Normal breath sounds. No decreased breath sounds, wheezing, rhonchi or rales. Chest:      Chest wall: No tenderness. Musculoskeletal: Normal range of motion. General: No deformity. Right lower leg: No edema. Left lower leg: No edema. Skin:     General: Skin is warm and dry. Neurological:      General: No focal deficit present. Mental Status: She is alert and oriented to person, place, and time. GCS: GCS eye subscore is 4. GCS verbal subscore is 5. GCS motor subscore is 6. Cranial Nerves: Cranial nerves are intact. Psychiatric:         Attention and Perception: Attention normal.         Mood and Affect: Mood normal.         Speech: Speech normal.         Behavior: Behavior normal. Behavior is cooperative. Thought Content:  Thought content includes suicidal given the following medications:  Medications   nitrofurantoin (macrocrystal-monohydrate) (MACROBID) capsule 100 mg (100 mg Oral Given 4/16/20 1634)       Patient Brought in by squad for evaluation of suicidal ideation. Patient does have a history of bipolar. She lives in a nursing home [address] Denies any suicidal plan but does admit to suicidal thoughts. On exam patient is alert oriented afebrile breathing on room air satting 100%. Nontoxic no acute respiratory distress. Old labs and records reviewed. CBC reveals no acute leukocytosis. Hemoglobin of 12.8. Salicylate negative. Acetaminophen level negative. Negative for ethanol. No acute electrolyte abnormalities. Troponin is less than 0.01. EKG was reviewed by my attending see note for dictation. Sitter in place. UTI revealed possible urinary tract infection will administer Macrobid here. Culture will be sent. Patient at this time is medically cleared. Patient will be evaluated by psychiatry. Please see their note for final disposition of this patient. Patient was stable at this time. Patient seen and evaluated by my attending as well. FINAL IMPRESSION      1. Suicidal ideation          DISPOSITION/PLAN   DISPOSITION Decision To Transfer 04/16/2020 08:19:10 PM      PATIENT REFERREDTO:  No follow-up provider specified.     DISCHARGE MEDICATIONS:  Discharge Medication List as of 4/16/2020  8:20 PM          DISCONTINUED MEDICATIONS:  Discharge Medication List as of 4/16/2020  8:20 PM      STOP taking these medications       montelukast (SINGULAIR) 10 MG tablet Comments:   Reason for Stopping:                      (Please note that portions of this note were completed with a voice recognition program.  Efforts were made to edit the dictations but occasionally words are mis-transcribed.)    Areli Mccrary PA-C (electronically signed)            Areli Mccrary PA-C  04/17/20 1640       Areli Mccrary PA-C  04/17/20 0775 yes

## 2020-04-17 LAB — URINE CULTURE, ROUTINE: NORMAL

## 2020-04-17 NOTE — ED NOTES
[facility] here to transport [facility]. Patient is calm and cooperative at this time. All paperwork and patient's personal property provided to EMS team.  Patient discharged from [facility] at 2017.      Amanda Ramos, RN  04/16/20 2017

## 2020-11-03 PROBLEM — I10 HTN (HYPERTENSION), BENIGN: Status: RESOLVED | Noted: 2020-11-03 | Resolved: 2020-11-03

## 2020-11-03 PROBLEM — E78.5 HYPERLIPIDEMIA: Status: RESOLVED | Noted: 2020-11-03 | Resolved: 2020-11-03

## 2021-03-18 ENCOUNTER — OFFICE VISIT (OUTPATIENT)
Dept: ORTHOPEDIC SURGERY | Age: 71
End: 2021-03-18
Payer: MEDICARE

## 2021-03-18 VITALS — RESPIRATION RATE: 12 BRPM | WEIGHT: 175 LBS | TEMPERATURE: 97.1 F | BODY MASS INDEX: 32.2 KG/M2 | HEIGHT: 62 IN

## 2021-03-18 DIAGNOSIS — M25.561 RIGHT KNEE PAIN, UNSPECIFIED CHRONICITY: ICD-10-CM

## 2021-03-18 DIAGNOSIS — Z96.652 STATUS POST LEFT KNEE REPLACEMENT: Primary | ICD-10-CM

## 2021-03-18 DIAGNOSIS — M17.12 PATELLOFEMORAL ARTHRITIS OF LEFT KNEE: ICD-10-CM

## 2021-03-18 DIAGNOSIS — M25.362 PATELLAR INSTABILITY OF LEFT KNEE: ICD-10-CM

## 2021-03-18 PROCEDURE — 1123F ACP DISCUSS/DSCN MKR DOCD: CPT | Performed by: ORTHOPAEDIC SURGERY

## 2021-03-18 PROCEDURE — 99204 OFFICE O/P NEW MOD 45 MIN: CPT | Performed by: ORTHOPAEDIC SURGERY

## 2021-03-18 PROCEDURE — G8417 CALC BMI ABV UP PARAM F/U: HCPCS | Performed by: ORTHOPAEDIC SURGERY

## 2021-03-18 PROCEDURE — 1090F PRES/ABSN URINE INCON ASSESS: CPT | Performed by: ORTHOPAEDIC SURGERY

## 2021-03-18 PROCEDURE — 1036F TOBACCO NON-USER: CPT | Performed by: ORTHOPAEDIC SURGERY

## 2021-03-18 PROCEDURE — G8400 PT W/DXA NO RESULTS DOC: HCPCS | Performed by: ORTHOPAEDIC SURGERY

## 2021-03-18 PROCEDURE — G8484 FLU IMMUNIZE NO ADMIN: HCPCS | Performed by: ORTHOPAEDIC SURGERY

## 2021-03-18 PROCEDURE — G8427 DOCREV CUR MEDS BY ELIG CLIN: HCPCS | Performed by: ORTHOPAEDIC SURGERY

## 2021-03-18 PROCEDURE — 4040F PNEUMOC VAC/ADMIN/RCVD: CPT | Performed by: ORTHOPAEDIC SURGERY

## 2021-03-18 PROCEDURE — 3017F COLORECTAL CA SCREEN DOC REV: CPT | Performed by: ORTHOPAEDIC SURGERY

## 2021-03-18 NOTE — PROGRESS NOTES
Chief Complaint  Knee Pain (NP Bilateral knee pain. Patient reports x 1 year. Left knee worsening over the last 2 months)      History of Present Illness:  Vianney Smith is a pleasant 66 y.o. female for evaluation of bilateral knee pain left side much worse than right. Bothering her for 1 year. No inciting injury. The pain is anterior. Worse with sitting and walking. Better with rest.  She has tried a lot of stretching physical therapy at her nursing home where she lives. With no avail. Denies any numbness tingling going down the leg. Surgery was done about 11 years ago By another surgeon. She was quite satisfied with the outcome. No other complications with her knee replacements. The surgeon has since retired. Medical History:  Patient's medications, allergies, past medical, surgical, social and family histories were reviewed and updated as appropriate. Pain Assessment  Location of Pain: Knee  Location Modifiers: Right  Severity of Pain: 0  Quality of Pain: Other (Comment)(stiff)  Duration of Pain: Persistent  Frequency of Pain: Constant  Aggravating Factors: Bending, Straightening, Walking, Stairs, Standing  Limiting Behavior: Some  Relieving Factors: Rest  Result of Injury: No  Work-Related Injury: No  Are there other pain locations you wish to document?: No  ROS: Review of systems reviewed from Patient History Form completed today and available in the patient's chart under the Media tab. Pertinent items are noted in HPI  Review of systems reviewed from Patient History Form completed today and available in the patient's chart under the Media tab. Vital Signs:  Temp 97.1 °F (36.2 °C)   Resp 12   Ht 5' 2\" (1.575 m)   Wt 175 lb (79.4 kg)   BMI 32.01 kg/m²         Neuro: Alert & oriented x 3,  normal,  no focal deficits noted. Normal affect.   Eyes: sclera clear  Ears: Normal external ear  Mouth:  No perioral lesions  Pulm: Respirations unlabored and regular  Pulse: Extremities well perfused. 2+ peripheral pulses. Skin: Warm. No ulcerations. Constitutional: The physical examination finds the patient to be well-developed and well-nourished. The patient is alert and oriented x3 and was cooperative throughout the visit. Left knee exam    Gait: Use of a wheelchair. No antalgic gait. Alignment: normal alignment. Inspection/skin: Previous scar anteriorly is well-healed with no evidence of infection skin is intact without erythema or ecchymosis. No gross deformity. Palpation: Very tender along the patellar tendon and the medial parapatellar soft tissues. Mild crepitus. no joint line tenderness present. Range of Motion: There is full range of motion. Very limited patellar mobility    Strength: Normal quadriceps development. Effusion: No effusion or swelling present. Ligamentous stability: No cruciate or collateral ligament instability. Neurologic and vascular: Skin is warm and well-perfused. Sensation is intact to light-touch. Special tests: Negative      Right knee exam    Gait: No use of assistive devices. No antalgic gait. Alignment: Previous scar well-healed normal alignment. Inspection/skin: Skin is intact without erythema or ecchymosis. No gross deformity. Palpation: mild crepitus. no joint line tenderness present. Range of Motion: There is full range of motion. Normal patellar mobility    Strength: Normal quadriceps development. Effusion: No effusion or swelling present. Ligamentous stability: No cruciate or collateral ligament instability. Neurologic and vascular: Skin is warm and well-perfused. Sensation is intact to light-touch. Special tests: Negative Bro sign. Radiology:       Pertinent imaging reviewed, images only - no report available.     Radiographs were obtained and reviewed in the office; 4 views: bilateral PA, bilateral Caleb Chin, bilateral Merchants AND right and left lateral Impression: Bilateral total knee replacements. The right side shows no evidence of loosening or component malpositioning. Left side seems unremarkable as well but possible movement at the patellar component. This not very obvious. Assessment: Patient is a 66 y.o. female left anterior knee pain about 10 years post status knee replacement. This is been ongoing for about a year. Mostly extensor mechanism discomfort possible patellar tendinitis but possible patellar component loosening as her patellar mobility is quite limited and could be due to a component issue. Impression:  Visit Diagnoses       Codes    Right knee pain, unspecified chronicity    -  Primary M25.561          Office Procedures:  No orders of the defined types were placed in this encounter. Orders Placed This Encounter   Procedures    XR KNEE RIGHT (MIN 4 VIEWS)     Standing Status:   Future     Number of Occurrences:   1     Standing Expiration Date:   3/18/2022    XR KNEE LEFT (3 VIEWS)     Standing Status:   Future     Number of Occurrences:   1     Standing Expiration Date:   3/18/2022       Plan: Think she is a candidate for CT scan of her left knee to rule out patellar component loosening or malalignment. This could be causing a stiffness and pain in the extensor mechanism. Meanwhile we can help control pain with a topical compounded cream including anti-inflammatory medicine and nerve numbing agents. This will be sent to her house. We will see her back after the scan. Should there be    Any component issue we can refer to her to revision knee replacement colleague of [facility] for further evaluation and treatment    All the patient's questions were answered while in the clinic. The patient is understanding of all instructions and agrees with the plan. Approximately 45 minutes was spent on patient education and coordinating care. Follow up in: No follow-ups on file.       Sincerely,    Sheryln Bolanos MD [facility] Orthopaedic Surgeon - [address]   [address], [address]  Email: [email or web address]  Office: [phone]    03/18/21  12:00 PM      The encounter with Katharine Harris was carried out by myself, Dr Hina Spicer, who personally examined the patient and reviewed the plan. This dictation was performed with a verbal recognition program (DRAGON) and it was checked for errors. It is possible that there are still dictated errors within this office note. If so, please bring any errors to my attention for an addendum. All efforts were made to ensure that this office note is accurate.

## 2021-03-25 ENCOUNTER — HOSPITAL ENCOUNTER (OUTPATIENT)
Dept: CT IMAGING | Age: 71
Discharge: HOME | End: 2021-03-25
Payer: MEDICARE

## 2021-03-25 DIAGNOSIS — M25.362 PATELLAR INSTABILITY OF LEFT KNEE: ICD-10-CM

## 2021-03-25 DIAGNOSIS — Z96.652 STATUS POST LEFT KNEE REPLACEMENT: ICD-10-CM

## 2021-03-25 DIAGNOSIS — M17.12 PATELLOFEMORAL ARTHRITIS OF LEFT KNEE: ICD-10-CM

## 2021-03-25 PROCEDURE — 73700 CT LOWER EXTREMITY W/O DYE: CPT

## 2021-06-02 ENCOUNTER — CLINICAL DOCUMENTATION (OUTPATIENT)
Dept: OTHER | Age: 71
End: 2021-06-02

## 2021-06-21 ENCOUNTER — HOSPITAL ENCOUNTER (EMERGENCY)
Age: 71
Discharge: NURSING, REHAB OR LONG-TERM CARE | End: 2021-06-21
Attending: EMERGENCY MEDICINE
Payer: MEDICARE

## 2021-06-21 VITALS
HEART RATE: 101 BPM | TEMPERATURE: 99.4 F | OXYGEN SATURATION: 98 % | SYSTOLIC BLOOD PRESSURE: 185 MMHG | RESPIRATION RATE: 18 BRPM | BODY MASS INDEX: 32.2 KG/M2 | HEIGHT: 62 IN | DIASTOLIC BLOOD PRESSURE: 86 MMHG | WEIGHT: 175 LBS

## 2021-06-21 DIAGNOSIS — F39 MOOD DISORDER (HCC): Primary | ICD-10-CM

## 2021-06-21 DIAGNOSIS — N30.00 ACUTE CYSTITIS WITHOUT HEMATURIA: ICD-10-CM

## 2021-06-21 LAB
A/G RATIO: 1.1 (ref 1.1–2.2)
ACETAMINOPHEN LEVEL: <5 UG/ML (ref 10–30)
ALBUMIN SERPL-MCNC: 3.6 G/DL (ref 3.4–5)
ALP BLD-CCNC: 136 U/L (ref 40–129)
ALT SERPL-CCNC: 7 U/L (ref 10–40)
ANION GAP SERPL CALCULATED.3IONS-SCNC: 8 MMOL/L (ref 3–16)
ANISOCYTOSIS: ABNORMAL
AST SERPL-CCNC: 15 U/L (ref 15–37)
BACTERIA: ABNORMAL /HPF
BANDED NEUTROPHILS RELATIVE PERCENT: 3 % (ref 0–7)
BASOPHILS ABSOLUTE: 0 K/UL (ref 0–0.2)
BASOPHILS RELATIVE PERCENT: 0 %
BILIRUB SERPL-MCNC: <0.2 MG/DL (ref 0–1)
BILIRUBIN URINE: NEGATIVE
BLOOD, URINE: NEGATIVE
BUN BLDV-MCNC: 20 MG/DL (ref 7–20)
CALCIUM SERPL-MCNC: 9.5 MG/DL (ref 8.3–10.6)
CHLORIDE BLD-SCNC: 107 MMOL/L (ref 99–110)
CLARITY: CLEAR
CO2: 26 MMOL/L (ref 21–32)
COLOR: YELLOW
CREAT SERPL-MCNC: 1 MG/DL (ref 0.6–1.2)
EOSINOPHILS ABSOLUTE: 0.3 K/UL (ref 0–0.6)
EOSINOPHILS RELATIVE PERCENT: 6 %
EPITHELIAL CELLS, UA: ABNORMAL /HPF (ref 0–5)
ETHANOL: NORMAL MG/DL (ref 0–0.08)
GFR AFRICAN AMERICAN: >60
GFR NON-AFRICAN AMERICAN: 54
GLOBULIN: 3.2 G/DL
GLUCOSE BLD-MCNC: 106 MG/DL (ref 70–99)
GLUCOSE URINE: NEGATIVE MG/DL
HCT VFR BLD CALC: 29.8 % (ref 36–48)
HEMOGLOBIN: 9.6 G/DL (ref 12–16)
KETONES, URINE: NEGATIVE MG/DL
LEUKOCYTE ESTERASE, URINE: ABNORMAL
LYMPHOCYTES ABSOLUTE: 1.2 K/UL (ref 1–5.1)
LYMPHOCYTES RELATIVE PERCENT: 21 %
MCH RBC QN AUTO: 26.4 PG (ref 26–34)
MCHC RBC AUTO-ENTMCNC: 32.3 G/DL (ref 31–36)
MCV RBC AUTO: 81.9 FL (ref 80–100)
MICROSCOPIC EXAMINATION: YES
MONOCYTES ABSOLUTE: 0.4 K/UL (ref 0–1.3)
MONOCYTES RELATIVE PERCENT: 8 %
MUCUS: ABNORMAL /LPF
NEUTROPHILS ABSOLUTE: 3.6 K/UL (ref 1.7–7.7)
NEUTROPHILS RELATIVE PERCENT: 62 %
NITRITE, URINE: NEGATIVE
PDW BLD-RTO: 17.8 % (ref 12.4–15.4)
PH UA: 7 (ref 5–8)
PLATELET # BLD: 231 K/UL (ref 135–450)
PLATELET SLIDE REVIEW: ADEQUATE
PMV BLD AUTO: 8.8 FL (ref 5–10.5)
POIKILOCYTES: ABNORMAL
POLYCHROMASIA: ABNORMAL
POTASSIUM REFLEX MAGNESIUM: 4.9 MMOL/L (ref 3.5–5.1)
PROTEIN UA: NEGATIVE MG/DL
RBC # BLD: 3.64 M/UL (ref 4–5.2)
RBC UA: ABNORMAL /HPF (ref 0–4)
SALICYLATE, SERUM: <0.3 MG/DL (ref 15–30)
SARS-COV-2, NAAT: NOT DETECTED
SLIDE REVIEW: ABNORMAL
SODIUM BLD-SCNC: 141 MMOL/L (ref 136–145)
SPECIFIC GRAVITY UA: 1.01 (ref 1–1.03)
TEAR DROP CELLS: ABNORMAL
TOTAL PROTEIN: 6.8 G/DL (ref 6.4–8.2)
URINE REFLEX TO CULTURE: YES
URINE TYPE: ABNORMAL
UROBILINOGEN, URINE: 0.2 E.U./DL
WBC # BLD: 5.5 K/UL (ref 4–11)
WBC UA: ABNORMAL /HPF (ref 0–5)

## 2021-06-21 PROCEDURE — 80053 COMPREHEN METABOLIC PANEL: CPT

## 2021-06-21 PROCEDURE — 6370000000 HC RX 637 (ALT 250 FOR IP): Performed by: EMERGENCY MEDICINE

## 2021-06-21 PROCEDURE — 87635 SARS-COV-2 COVID-19 AMP PRB: CPT

## 2021-06-21 PROCEDURE — 81001 URINALYSIS AUTO W/SCOPE: CPT

## 2021-06-21 PROCEDURE — 80143 DRUG ASSAY ACETAMINOPHEN: CPT

## 2021-06-21 PROCEDURE — 87086 URINE CULTURE/COLONY COUNT: CPT

## 2021-06-21 PROCEDURE — 85025 COMPLETE CBC W/AUTO DIFF WBC: CPT

## 2021-06-21 PROCEDURE — 99285 EMERGENCY DEPT VISIT HI MDM: CPT

## 2021-06-21 PROCEDURE — 80179 DRUG ASSAY SALICYLATE: CPT

## 2021-06-21 PROCEDURE — 82077 ASSAY SPEC XCP UR&BREATH IA: CPT

## 2021-06-21 RX ORDER — FLUOXETINE 10 MG/1
40 TABLET, FILM COATED ORAL DAILY
COMMUNITY

## 2021-06-21 RX ORDER — RANOLAZINE 500 MG/1
500 TABLET, EXTENDED RELEASE ORAL 2 TIMES DAILY
COMMUNITY

## 2021-06-21 RX ORDER — OMEPRAZOLE 20 MG/1
20 CAPSULE, DELAYED RELEASE ORAL DAILY
COMMUNITY

## 2021-06-21 RX ORDER — LISINOPRIL 10 MG/1
10 TABLET ORAL DAILY
COMMUNITY

## 2021-06-21 RX ORDER — SULFAMETHOXAZOLE AND TRIMETHOPRIM 800; 160 MG/1; MG/1
1 TABLET ORAL ONCE
Status: COMPLETED | OUTPATIENT
Start: 2021-06-21 | End: 2021-06-21

## 2021-06-21 RX ORDER — ALBUTEROL SULFATE 90 UG/1
2 AEROSOL, METERED RESPIRATORY (INHALATION) EVERY 6 HOURS PRN
COMMUNITY

## 2021-06-21 RX ORDER — LANOLIN ALCOHOL/MO/W.PET/CERES
6 CREAM (GRAM) TOPICAL NIGHTLY
COMMUNITY

## 2021-06-21 RX ORDER — SULFAMETHOXAZOLE AND TRIMETHOPRIM 800; 160 MG/1; MG/1
1 TABLET ORAL 2 TIMES DAILY
Qty: 14 TABLET | Refills: 0 | Status: SHIPPED | OUTPATIENT
Start: 2021-06-21 | End: 2021-06-28

## 2021-06-21 RX ORDER — POTASSIUM CHLORIDE 750 MG/1
10 TABLET, EXTENDED RELEASE ORAL 2 TIMES DAILY
COMMUNITY

## 2021-06-21 RX ORDER — NITROGLYCERIN 0.4 MG/1
0.4 TABLET SUBLINGUAL EVERY 5 MIN PRN
COMMUNITY

## 2021-06-21 RX ORDER — ISOSORBIDE DINITRATE 20 MG/1
60 TABLET ORAL DAILY
COMMUNITY

## 2021-06-21 RX ORDER — ONDANSETRON 4 MG/1
4 TABLET, FILM COATED ORAL EVERY 6 HOURS PRN
COMMUNITY

## 2021-06-21 RX ORDER — GABAPENTIN 400 MG/1
800 CAPSULE ORAL 3 TIMES DAILY
COMMUNITY

## 2021-06-21 RX ORDER — ACETAMINOPHEN 325 MG/1
650 TABLET ORAL ONCE
Status: COMPLETED | OUTPATIENT
Start: 2021-06-21 | End: 2021-06-21

## 2021-06-21 RX ORDER — LIDOCAINE 4 G/G
1 PATCH TOPICAL DAILY
COMMUNITY

## 2021-06-21 RX ORDER — QUETIAPINE FUMARATE 25 MG/1
25 TABLET, FILM COATED ORAL 2 TIMES DAILY
COMMUNITY

## 2021-06-21 RX ADMIN — SULFAMETHOXAZOLE AND TRIMETHOPRIM 1 TABLET: 800; 160 TABLET ORAL at 20:34

## 2021-06-21 RX ADMIN — ACETAMINOPHEN 650 MG: 325 TABLET ORAL at 20:00

## 2021-06-21 ASSESSMENT — PAIN SCALES - GENERAL: PAINLEVEL_OUTOF10: 9

## 2021-06-21 ASSESSMENT — PAIN DESCRIPTION - DESCRIPTORS: DESCRIPTORS: SHARP

## 2021-06-21 ASSESSMENT — ACTIVITIES OF DAILY LIVING (ADL): EFFECT OF PAIN ON DAILY ACTIVITIES: CHRONIC

## 2021-06-21 ASSESSMENT — PAIN DESCRIPTION - FREQUENCY: FREQUENCY: CONTINUOUS

## 2021-06-21 ASSESSMENT — PAIN DESCRIPTION - LOCATION: LOCATION: CHEST

## 2021-06-21 NOTE — ED NOTES
Bed: 09  Expected date:   Expected time:   Means of arrival:   Comments:  [address]  06/21/21 0584

## 2021-06-22 NOTE — ED NOTES
Collateral Contact:  Name: Brooklyn Valentine LPN at [address]   Relation to Patient: LPN at nursing home   Last Contact with Patient: the other night   Concerns: Brooklyn Serge reports what she was given in report was that pt went out of a door that was alarmed. They reports pt stated she wanted to kill herself and walked out the doors headed towards the [address] of [address]. Brooklyn Valentine reports pt was sent out a week ago prior to this to [facility] for Sharyle Navy reports on June 11th they discounted pt's Prozac and put her on symbolta  60 mg daily for depression. Brookyln Valentine reports pr can be tearful at times. She reports pt was saying she was going to be moving to another place. They reports pt states she does not want to live at the [address] anymore. Brooklyn Valentine reports besides today pt has not attempted to harm herself at the nursing home, just made SI statements here and there. Brooklyn Valentine reports pt's daughter has stated pt has attempted in the past due to pt's bi-polar disorder. Brooklyn Valentine reports if pt is discharged she is able to return to facility. Per on call Dr. Judit Rossi pt is to returned to a locked unit. Brooklyn Valentine reports they are able to transfer pt to a locked unit upon return to facility. Writer informed Brooklyn Valentine of pt being discharged and will be returning to facility.        Dax Chavarria [facility]

## 2021-06-22 NOTE — ED NOTES
\"She's been saying this my whole life. She's manipulative and says it to get what she wants\". Today, patient states that if she has to return to [facility], she will, \"walk out the doors\" and try to walk out in front of traffic to end her life. Patient did have an overdose of meds in March of 2019. Per daughter, patient did overdose another time approximately 35 years ago and also cut her wrists approximately 45 years ago. States that patient has had no other actual attempts that she is aware of. Psychiatric History: Per daughter, patient with very long history of Bi-Polar disorder. Patient reported diagnosis Bi-Polar    Outpatient services/ Provider: Patient lives at The [place] [address] and is seen by facility psych service provider. Previous Inpatient Admissions( including location and dates if known): [facility]:  Has had multiple inpatient stays over the past couple of years; [facility] 2018    Self-injurious/ Self-harm behavior: None at this time. History of violence: None    Current Substance use: None    Trauma identified: None noted. Access to Firearms: None    ASSESSMENT FOR IMMINENT FUTURE DANGER:      RISK FACTORS:    [x]  Age <25 or >49   []  Male gender   [x]  Depressed mood   [x]  Active suicidal ideation:  Conditional plan to walk out of facility if she has to return to The [place] [address]. Wants to be transferred to [facility]. [x]  Suicide plan:  Conditional plan if she has to return to The [place] [address]. States she would walk out of the facility again and try to walk in front of traffic.    []  Suicide attempt   []  Access to lethal means   [x]  Prior suicide attempt   []  Active substance abuse   [x]  Highly impulsive behaviors   []  Not attending to self-care/ADLs    []  Recent significant loss   [x]  Chronic pain or medical illness   []  Social isolation   []  History of violence   []  Active psychosis   [x]  Cognitive impairment    []  No outpatient services in place   []  Medication noncompliance   []  No collateral information to support safety    PROTECTIVE FACTORS:  [] Age >25 and <55  [x] Female gender   [] Denies depression  [] Denies suicidal ideation  [] Does not have lethal plan   [x] Does not have access to guns or weapons  [] Patient is verbally pita for safety  [] No prior suicide attempts  [x] No active substance abuse  [x] Patient has social or family support  [] No active psychosis or cognitive dysfunction  [] Physically healthy  [x] Has outpatient services in place:  Patient lives in [facility] and sees facility MD and psych service provider. [] Compliant with recommended medications  [x] Analia Anderson, spoke with facility nurse, Kathy Don LPN, at The [address] who states they are able to move patient into the locked unit and monitor patient for safety if she is transferred back to their facility. [x] Patient is future oriented with plans to move to [facility]. Clinical Summary:    Patient presents to the ED after being sent by [facility] after patient stated if she had to stay at their facility another day she was going to walk out the door and head for traffic in order to kill herself. Patient was clinically sober at the time of the evaluation. Patient was evaluated and offered supportive counseling. Collateral information was gathered by writer from patient's daughter/POA, Mamadou Andree and SNEHAL Crouch from [facility] nurse, Kathy Don LPN.                    Kamila Dowd RN  06/21/21 [address] [facility] Anaid Eubanks RN  06/21/21 6840

## 2021-06-22 NOTE — ED NOTES
Walked by pt room and saw she was scooting to bottom of bed attempting to get out. I went into room to stop her and pt yelling at me saying she is leaving. She is angry and yelling that \"she\", pointing in direction of FRANK (Sathya Amish just came out of room from speaking with pt) \"will not listen to me, I AM NOT going back to that place! She will not listen, I am leaving\". I tried to explain to pt that she cannot just walk out. \"yes I can, I can do anything I want, you cannot keep me prisoner here\". I tried to explain to pt that she needs to wait until I can found out what the plan is. Pt is insistent that she is NOT going back to [facility], she hates that [facility] and they are going to send me back there and I am not going. Pt insistent that she is going to leave. She says she will call a cab but when asked where she is going she says she has no place to go. She says she has 2 daughters who dont come to see her and dont care. She is very argumentative. Zita Adams from [facility] AN AFFILIATE OF [facility] is tasking down here and he is currently volunteering to sit with pt and keep her calm until decision is made on her returning to [address] [facility].       Kim Arreola, DEANNA  06/21/21 7581

## 2021-06-22 NOTE — ED NOTES
Juve Holland, sitting with pt, had to leave for another task so Mehdi from [facility] AN AFFILIATE OF [facility] is sitting with pt at this time. Pt is calm at this moment but still sitting at end of bed refusing to lay back.       Sol Zuñiga RN  06/21/21 1485

## 2021-06-22 NOTE — ED NOTES
Pt has all of her leads off including her BP cuff. I am not going to aggravate her at this point to ask her to put them back on.      Jannis Prader, RN  06/21/21 4957

## 2021-06-22 NOTE — ED NOTES
Pt still very insistent that she does not want to return to [facility]. She say she will walk out when she returns there. Will call report for her return.       Thomas Lopez RN  06/21/21 5316

## 2021-06-22 NOTE — ED PROVIDER NOTES
[facility] ED      CHIEF COMPLAINT  Psychiatric Evaluation (patient has hx of bipolar. resident of [address]. patient states. they have taken her ipad, and phone and she has no way of communicating with her daughter. states she was fed up with the lies and so she threatened to walk out into traffic if they would not let her see her daughter )       Daniel Gan is a 66 y.o. female with history of bipolar 1 disorder who was sent by the nursing home for psychiatric evaluation. Per the EMS, patient made comments about wanting to kill herself by going out to the traffic and staff were concerned and sent her in for evaluation. Patient reports the nursing home has a lot of rules and she does not like living there. She has brought this up before and has social workers who were helping her with the case. However, says that was months ago. They still have not found an alternative home that she could go to. Says she was fed up and said that she was going to kill her self. However, denies having SI currently. Denies having HI. Denies any recent illnesses. No other complaints, modifying factors or associated symptoms. I have reviewed the following from the nursing documentation. Past Medical History:   Diagnosis Date    Anxiety     Asthma     Bipolar 1 disorder ([address]     CAD (coronary artery disease)     Depression     GERD (gastroesophageal reflux disease)     Suicidal behavior     hx: 5 years ago and 4 other times 10 years ago    Thyroid disease      Past Surgical History:   Procedure Laterality Date    BACK SURGERY      CHOLECYSTECTOMY      JOINT REPLACEMENT      SHOULDER ARTHROPLASTY Left      Family History   Problem Relation Age of Onset    High Blood Pressure Mother     High Blood Pressure Father     Cancer Sister      Social History     Socioeconomic History    Marital status:       Spouse name: Not on file    Number alert. No acute distress. HENT: Normocephalic. Atraumatic. PERRL. EOMI. No facial droop. HEART/CHEST: RRR. LUNGS: Respirations unlabored. Speaking comfortably in full sentences. ABDOMEN: Soft, non-distended abdomen. Non tender to palpation. No guarding. No rebound. EXTREMITIES: No gross deformities. Moving all extremities. SKIN: Warm and dry. No acute rashes. NEUROLOGICAL: Alert and oriented. No gross facial drooping. Answering questions appropriately. Moving all extremities. PSYCHIATRIC: Pleasant. Normal mood and affect.     LABS  Results for orders placed or performed during the hospital encounter of 06/21/21   COVID-19, Rapid    Specimen: Nasopharyngeal Swab   Result Value Ref Range    SARS-CoV-2, NAAT Not Detected Not Detected   Urinalysis Reflex to Culture    Specimen: Urine, clean catch   Result Value Ref Range    Color, UA Yellow Straw/Yellow    Clarity, UA Clear Clear    Glucose, Ur Negative Negative mg/dL    Bilirubin Urine Negative Negative    Ketones, Urine Negative Negative mg/dL    Specific Gravity, UA 1.015 1.005 - 1.030    Blood, Urine Negative Negative    pH, UA 7.0 5.0 - 8.0    Protein, UA Negative Negative mg/dL    Urobilinogen, Urine 0.2 <2.0 E.U./dL    Nitrite, Urine Negative Negative    Leukocyte Esterase, Urine TRACE (A) Negative    Microscopic Examination YES     Urine Type NotGiven     Urine Reflex to Culture Yes    CBC Auto Differential   Result Value Ref Range    WBC 5.5 4.0 - 11.0 K/uL    RBC 3.64 (L) 4.00 - 5.20 M/uL    Hemoglobin 9.6 (L) 12.0 - 16.0 g/dL    Hematocrit 29.8 (L) 36.0 - 48.0 %    MCV 81.9 80.0 - 100.0 fL    MCH 26.4 26.0 - 34.0 pg    MCHC 32.3 31.0 - 36.0 g/dL    RDW 17.8 (H) 12.4 - 15.4 %    Platelets 362 785 - 834 K/uL    MPV 8.8 5.0 - 10.5 fL    PLATELET SLIDE REVIEW Adequate     SLIDE REVIEW see below     Neutrophils % 62.0 %    Lymphocytes % 21.0 %    Monocytes % 8.0 %    Eosinophils % 6.0 %    Basophils % 0.0 %    Neutrophils Absolute 3.6 1.7 - 7.7 K/uL Lymphocytes Absolute 1.2 1.0 - 5.1 K/uL    Monocytes Absolute 0.4 0.0 - 1.3 K/uL    Eosinophils Absolute 0.3 0.0 - 0.6 K/uL    Basophils Absolute 0.0 0.0 - 0.2 K/uL    Bands Relative 3 0 - 7 %    Anisocytosis 1+ (A)     Polychromasia Occasional (A)     Poikilocytes Occasional (A)     Tear Drop Cells Occasional (A)    Comprehensive Metabolic Panel w/ Reflex to MG   Result Value Ref Range    Sodium 141 136 - 145 mmol/L    Potassium reflex Magnesium 4.9 3.5 - 5.1 mmol/L    Chloride 107 99 - 110 mmol/L    CO2 26 21 - 32 mmol/L    Anion Gap 8 3 - 16    Glucose 106 (H) 70 - 99 mg/dL    BUN 20 7 - 20 mg/dL    CREATININE 1.0 0.6 - 1.2 mg/dL    GFR Non-African American 54 (A) >60    GFR African American >60 >60    Calcium 9.5 8.3 - 10.6 mg/dL    Total Protein 6.8 6.4 - 8.2 g/dL    Albumin 3.6 3.4 - 5.0 g/dL    Albumin/Globulin Ratio 1.1 1.1 - 2.2    Total Bilirubin <0.2 0.0 - 1.0 mg/dL    Alkaline Phosphatase 136 (H) 40 - 129 U/L    ALT 7 (L) 10 - 40 U/L    AST 15 15 - 37 U/L    Globulin 3.2 g/dL   Ethanol   Result Value Ref Range    Ethanol Lvl None Detected mg/dL   Acetaminophen (TYLENOL) level   Result Value Ref Range    Acetaminophen Level <5 (L) 10 - 30 ug/mL   Salicylate   Result Value Ref Range    Salicylate, Serum <9.2 (L) 15.0 - 30.0 mg/dL   Microscopic Urinalysis   Result Value Ref Range    Mucus, UA Rare (A) None Seen /LPF    WBC, UA 10-20 (A) 0 - 5 /HPF    RBC, UA 0-2 0 - 4 /HPF    Epithelial Cells, UA 2-5 0 - 5 /HPF    Bacteria, UA Rare (A) None Seen /HPF       I have reviewed all labs for this visit. RADIOLOGY  No results found. ED COURSE/MDM  Patient seen and evaluated. At presentation, patient was awake, alert, afebrile, hemodynamically stable, and satting well on room air. Patient was sent over for psychiatric evaluation. Basic labs and urinalysis obtained. Urinalysis shows trace leuk esterase. There was 10-20 white blood cell and rare bacteria in the urine. Patient given Bactrim for acute cystitis. Creatinine is unchanged compared to baseline. Patient is medically cleared. Patient evaluated by psychiatry and cleared for discharge. Patient sent back to the nursing home in stable condition. She was prescribed Bactrim for the UTI. Discharge. Pt was seen during the [place] 19 pandemic. Appropriate PPE worn by ME during patient encounters. Pt seen during a time with constrained hospital bed capacity and other potential inpatient and outpatient resources were constrained due to the viral pandemic. During the patient's ED course, the patient was given:  Medications   acetaminophen (TYLENOL) tablet 650 mg (650 mg Oral Given 6/21/21 2000)   sulfamethoxazole-trimethoprim (BACTRIM DS;SEPTRA DS) 800-160 MG per tablet 1 tablet (1 tablet Oral Given 6/21/21 2034)        CLINICAL IMPRESSION  1. Mood disorder ([address]    2. Acute cystitis without hematuria        Blood pressure (!) 185/86, pulse 101, temperature 99.4 °F (37.4 °C), resp. rate 18, height 5' 2\" (1.575 m), weight 175 lb (79.4 kg), SpO2 98 %. Lucero Clark was discharged back to the nursing home. Patient was given scripts for the following medications. I counseled patient how to take these medications. New Prescriptions    SULFAMETHOXAZOLE-TRIMETHOPRIM (BACTRIM DS;SEPTRA DS) 800-160 MG PER TABLET    Take 1 tablet by mouth 2 times daily for 7 days       [facility] [address] [phone]    In 1 day        DISCLAIMER: This chart was created using Dragon dictation software. Efforts were made by me to ensure accuracy, however some errors may be present due to limitations of this technology and occasionally words are not transcribed correctly.        Rowan Ramachandran MD  06/22/21 4050

## 2021-06-22 NOTE — ED NOTES
Writer has attempted multiple times to reach pt's nursing home [facility] [address] [facility]. Writer was able to get through the first time and was being transferred to pt's nurse, they did not answer. Writer has attempted twice more to reach the nursing home but no answer at the . Writer will continue to call.        Verónica FERRER

## 2021-06-22 NOTE — ED NOTES
Level of Care Disposition: Discharge back to The [address]. Patient will be moved into locked unit at facility per Sheldon BAILEY. Patient was seen by ED provider and [facility] AN [facility]FFILIATE OF [facility] staff. The case was presented to psychiatric provider on-call, Dr. Phani Syed. Based on the ED evaluation and information presented to the provider by Vicente Sood, it is the recommendation of the on call psychiatric provider that the patient be discharged from a psychiatric standpoint with the following referrals: Return to The [address] to secure unit and follow up with facility psychiatric service provider. RATIONALE FOR NON-ADMISSION:  The patient does not meet criteria for an involuntary psychiatric admission because she is not an imminent risk to herself at this time. Patient will be moved to secured/locked unit when she returns to facility and will be monitored for safety there by staff. Daughter, Damon Davis, updated in regards to disposition and is in agreement with plan.        Rosy Victoria RN  06/21/21 5272

## 2021-06-24 LAB — URINE CULTURE, ROUTINE: NORMAL
